# Patient Record
Sex: MALE | Race: WHITE | Employment: OTHER | URBAN - METROPOLITAN AREA
[De-identification: names, ages, dates, MRNs, and addresses within clinical notes are randomized per-mention and may not be internally consistent; named-entity substitution may affect disease eponyms.]

---

## 2017-03-08 LAB
ALBUMIN SERPL-MCNC: 4.2 G/DL
ALP BLD-CCNC: 55 U/L
ALT SERPL-CCNC: 21 U/L
ANION GAP SERPL CALCULATED.3IONS-SCNC: NORMAL MMOL/L
AST SERPL-CCNC: 21 U/L
BASOPHILS ABSOLUTE: 0 /ΜL
BASOPHILS RELATIVE PERCENT: 0.4 %
BILIRUB SERPL-MCNC: 0.5 MG/DL (ref 0.1–1.4)
BUN BLDV-MCNC: 20 MG/DL
CALCIUM SERPL-MCNC: 9.2 MG/DL
CHLORIDE BLD-SCNC: 102 MMOL/L
CHOLESTEROL, TOTAL: 184 MG/DL
CHOLESTEROL/HDL RATIO: NORMAL
CO2: 22 MMOL/L
CREAT SERPL-MCNC: 1.3 MG/DL
EOSINOPHILS ABSOLUTE: 0.2 /ΜL
EOSINOPHILS RELATIVE PERCENT: 3 %
GFR CALCULATED: NORMAL
GLUCOSE BLD-MCNC: 107 MG/DL
HCT VFR BLD CALC: 47.6 % (ref 41–53)
HDLC SERPL-MCNC: 51 MG/DL (ref 35–70)
HEMOGLOBIN: 15.8 G/DL (ref 13.5–17.5)
LDL CHOLESTEROL CALCULATED: 116 MG/DL (ref 0–160)
LYMPHOCYTES ABSOLUTE: 1.1 /ΜL
LYMPHOCYTES RELATIVE PERCENT: 13.6 %
MCH RBC QN AUTO: 31 PG
MCHC RBC AUTO-ENTMCNC: 33.1 G/DL
MCV RBC AUTO: 93.6 FL
MONOCYTES ABSOLUTE: 1.3 /ΜL
MONOCYTES RELATIVE PERCENT: 16.9 %
NEUTROPHILS ABSOLUTE: 5.2 /ΜL
NEUTROPHILS RELATIVE PERCENT: 66.1 %
PLATELET # BLD: 216 K/ΜL
PMV BLD AUTO: NORMAL FL
POTASSIUM SERPL-SCNC: 4.2 MMOL/L
RBC # BLD: 5.09 10^6/ΜL
SODIUM BLD-SCNC: 138 MMOL/L
TOTAL PROTEIN: 7
TRIGL SERPL-MCNC: 83 MG/DL
VLDLC SERPL CALC-MCNC: 17 MG/DL
WBC # BLD: 7.8 10^3/ML

## 2017-05-12 ENCOUNTER — OFFICE VISIT (OUTPATIENT)
Dept: CARDIOLOGY CLINIC | Age: 62
End: 2017-05-12

## 2017-05-12 VITALS
DIASTOLIC BLOOD PRESSURE: 74 MMHG | SYSTOLIC BLOOD PRESSURE: 122 MMHG | WEIGHT: 214.6 LBS | BODY MASS INDEX: 28.44 KG/M2 | HEIGHT: 73 IN

## 2017-05-12 DIAGNOSIS — I48.0 PAROXYSMAL ATRIAL FIBRILLATION (HCC): Primary | ICD-10-CM

## 2017-05-12 DIAGNOSIS — K21.9 GASTROESOPHAGEAL REFLUX DISEASE WITHOUT ESOPHAGITIS: ICD-10-CM

## 2017-05-12 DIAGNOSIS — I10 ESSENTIAL HYPERTENSION: ICD-10-CM

## 2017-05-12 DIAGNOSIS — E78.2 MIXED HYPERLIPIDEMIA: ICD-10-CM

## 2017-05-12 PROCEDURE — 93000 ELECTROCARDIOGRAM COMPLETE: CPT | Performed by: INTERNAL MEDICINE

## 2017-05-12 PROCEDURE — 99213 OFFICE O/P EST LOW 20 MIN: CPT | Performed by: INTERNAL MEDICINE

## 2017-05-12 PROCEDURE — G8419 CALC BMI OUT NRM PARAM NOF/U: HCPCS | Performed by: INTERNAL MEDICINE

## 2017-05-12 PROCEDURE — G8427 DOCREV CUR MEDS BY ELIG CLIN: HCPCS | Performed by: INTERNAL MEDICINE

## 2017-05-12 PROCEDURE — 1036F TOBACCO NON-USER: CPT | Performed by: INTERNAL MEDICINE

## 2017-05-12 PROCEDURE — 3017F COLORECTAL CA SCREEN DOC REV: CPT | Performed by: INTERNAL MEDICINE

## 2017-05-12 RX ORDER — FAMOTIDINE 20 MG/1
20 TABLET, FILM COATED ORAL 2 TIMES DAILY
COMMUNITY

## 2017-05-12 RX ORDER — MONTELUKAST SODIUM 10 MG/1
10 TABLET ORAL NIGHTLY
COMMUNITY

## 2017-05-15 ENCOUNTER — TELEPHONE (OUTPATIENT)
Dept: CARDIOLOGY CLINIC | Age: 62
End: 2017-05-15

## 2017-07-31 ENCOUNTER — TELEPHONE (OUTPATIENT)
Dept: CARDIOLOGY CLINIC | Age: 62
End: 2017-07-31

## 2018-04-04 RX ORDER — FLECAINIDE ACETATE 100 MG/1
100 TABLET ORAL 2 TIMES DAILY
Qty: 60 TABLET | Refills: 11 | Status: SHIPPED | OUTPATIENT
Start: 2018-04-04 | End: 2018-07-06 | Stop reason: SDUPTHER

## 2018-07-06 ENCOUNTER — OFFICE VISIT (OUTPATIENT)
Dept: CARDIOLOGY CLINIC | Age: 63
End: 2018-07-06

## 2018-07-06 VITALS
BODY MASS INDEX: 29.26 KG/M2 | HEART RATE: 67 BPM | DIASTOLIC BLOOD PRESSURE: 80 MMHG | HEIGHT: 73 IN | WEIGHT: 220.8 LBS | SYSTOLIC BLOOD PRESSURE: 110 MMHG

## 2018-07-06 DIAGNOSIS — I10 ESSENTIAL HYPERTENSION: ICD-10-CM

## 2018-07-06 DIAGNOSIS — K21.9 GASTROESOPHAGEAL REFLUX DISEASE WITHOUT ESOPHAGITIS: ICD-10-CM

## 2018-07-06 DIAGNOSIS — I48.0 PAROXYSMAL ATRIAL FIBRILLATION (HCC): Primary | ICD-10-CM

## 2018-07-06 DIAGNOSIS — E78.2 MIXED HYPERLIPIDEMIA: ICD-10-CM

## 2018-07-06 PROCEDURE — 99213 OFFICE O/P EST LOW 20 MIN: CPT | Performed by: INTERNAL MEDICINE

## 2018-07-06 PROCEDURE — G8419 CALC BMI OUT NRM PARAM NOF/U: HCPCS | Performed by: INTERNAL MEDICINE

## 2018-07-06 PROCEDURE — 1036F TOBACCO NON-USER: CPT | Performed by: INTERNAL MEDICINE

## 2018-07-06 PROCEDURE — 93000 ELECTROCARDIOGRAM COMPLETE: CPT | Performed by: INTERNAL MEDICINE

## 2018-07-06 PROCEDURE — G8427 DOCREV CUR MEDS BY ELIG CLIN: HCPCS | Performed by: INTERNAL MEDICINE

## 2018-07-06 PROCEDURE — 3017F COLORECTAL CA SCREEN DOC REV: CPT | Performed by: INTERNAL MEDICINE

## 2018-07-06 RX ORDER — FLECAINIDE ACETATE 100 MG/1
100 TABLET ORAL 2 TIMES DAILY
Qty: 180 TABLET | Refills: 3 | Status: SHIPPED | OUTPATIENT
Start: 2018-07-06 | End: 2019-08-01 | Stop reason: SDUPTHER

## 2018-07-06 NOTE — PATIENT INSTRUCTIONS
Please remember to bring all medication bottles or a medication list with you to your appointment. If you have any questions, please call our office at [phone]. CAD:No  HTN:well controlled on current medical regimen, see list above. - changes in  treatment:   no   CARDIOMYOPATHY: None known   CONGESTIVE HEART FAILURE: NO KNOWN HISTORY.     VHD: No significant VHD noted  DYSLIPIDEMIA: Patient's profile is at / near [employer],    OTHER RELEVANT DIAGNOSIS:as noted in patient's active problem list:  TESTS ORDERED: None this visit                                   All previously ordered tests reviewed. ARRHYTHMIAS: known                                Patient has H/O Atrial fibrillation                                Patient is in NSR with the help of anti arrhythmics. MEDICATIONS: CPM   Office f/u in one year. Primary/secondary prevention is the goal by aggressive risk modification, healthy and therapeutic life style changes for cardiovascular risk reduction. Various goals are discussed and multiple questions answered.

## 2018-07-06 NOTE — PROGRESS NOTES
and leg or swelling in foot and leg  Neurological: Negative for dizziness, headaches, memory loss, numbness/tingling, visual changes, syncope  Dermatological: Negative for rash    Objective:  /80   Pulse 67   Ht 6' 1\" (1.854 m)   Wt 220 lb 12.8 oz (100.2 kg)   BMI 29.13 kg/m²   Wt Readings from Last 3 Encounters:   07/06/18 220 lb 12.8 oz (100.2 kg)   05/12/17 214 lb 9.6 oz (97.3 kg)   12/13/16 221 lb 12.8 oz (100.6 kg)     Body mass index is 29.13 kg/m². GENERAL - Alert, oriented, pleasant, in no apparent distress. EYES: No jaundice, no conjunctival pallor. SKIN: It is warm & dry. No rashes. No Echhymosis    HEENT  No clinically significant abnormalities seen. Neck - Supple. No jugular venous distention noted. No carotid bruits. Cardiovascular  Normal S1 and S2 without obvious murmur or gallop. Extremities - No cyanosis, clubbing, or significant edema. Pulmonary  No respiratory distress. No wheezes or rales. Abdomen  No masses, tenderness, or organomegaly. Musculoskeletal  No significant edema. No joint deformities. No muscle wasting. Neurologic  Cranial nerves II through XII are grossly intact. There were no gross focal neurologic abnormalities.     Lab Review   No results found for: CKTOTAL, CKMB, CKMBINDEX, TROPONINT  BNP:  No results found for: BNP  PT/INR:  No results found for: INR  No results found for: LABA1C  Lab Results   Component Value Date    WBC 7.1 01/09/2012    HCT 41.6 (L) 01/09/2012    MCV 94.1 01/09/2012     01/09/2012     No results found for: CHOL, TRIG, HDL, LDLCALC, LDLDIRECT, CHOLHDLRATIO  No results found for: ALT, AST  BMP:    Lab Results   Component Value Date     01/09/2012    K 4.3 01/09/2012     01/09/2012    CO2 29 01/09/2012    BUN 21 01/09/2012    CREATININE 1.1 01/09/2012     CMP:   Lab Results   Component Value Date     01/09/2012    K 4.3 01/09/2012     01/09/2012    CO2 29 01/09/2012    BUN 21 01/09/2012     TSH: No results found for: TSH, TSHHS    QUALITY MEASURES REVIEWED:  1.CAD:Patient is taking anti platelet agent:Yes  2. DYSLIPIDEMIA: Patient is on cholesterol lowering medication:Yes  3. Beta-Blocker therapy for CAD, if prior Myocardial Infarction:Yes  4. Atrial fibrillation & anticoagulation therapy No  5. Discussed weight management strategies. EKG: NSR, QTc 424    Impression:    1. Paroxysmal atrial fibrillation (HCC)    2. Mixed hyperlipidemia    3. Gastroesophageal reflux disease without esophagitis    4. Essential hypertension       Patient Active Problem List   Diagnosis Code    Radial head fracture S52.123A    Atrial fibrillation (HCC) I48.91    Hyperlipidemia E78.5    GERD (gastroesophageal reflux disease) K21.9    Hypertension I10       Assessment & Plan:    CAD:No  HTN:well controlled on current medical regimen, see list above. - changes in  treatment:   no   CARDIOMYOPATHY: None known   CONGESTIVE HEART FAILURE: NO KNOWN HISTORY.     VHD: No significant VHD noted  DYSLIPIDEMIA: Patient's profile is at / near [employer],    OTHER RELEVANT DIAGNOSIS:as noted in patient's active problem list:  TESTS ORDERED: None this visit                                   All previously ordered tests reviewed. ARRHYTHMIAS: known                                Patient has H/O Atrial fibrillation                                Patient is in NSR with the help of anti arrhythmics. MEDICATIONS: CPM   Office f/u in one year. Primary/secondary prevention is the goal by aggressive risk modification, healthy and therapeutic life style changes for cardiovascular risk reduction. Various goals are discussed and multiple questions answered.

## 2019-01-18 ENCOUNTER — TELEPHONE (OUTPATIENT)
Dept: CARDIOLOGY CLINIC | Age: 64
End: 2019-01-18

## 2019-01-21 ENCOUNTER — OFFICE VISIT (OUTPATIENT)
Dept: CARDIOLOGY CLINIC | Age: 64
End: 2019-01-21
Payer: COMMERCIAL

## 2019-01-21 VITALS
HEIGHT: 73 IN | DIASTOLIC BLOOD PRESSURE: 84 MMHG | SYSTOLIC BLOOD PRESSURE: 122 MMHG | BODY MASS INDEX: 30.27 KG/M2 | HEART RATE: 60 BPM | WEIGHT: 228.4 LBS

## 2019-01-21 DIAGNOSIS — I10 ESSENTIAL HYPERTENSION: ICD-10-CM

## 2019-01-21 DIAGNOSIS — I48.0 PAROXYSMAL ATRIAL FIBRILLATION (HCC): Primary | ICD-10-CM

## 2019-01-21 DIAGNOSIS — K21.9 GASTROESOPHAGEAL REFLUX DISEASE WITHOUT ESOPHAGITIS: ICD-10-CM

## 2019-01-21 DIAGNOSIS — E78.2 MIXED HYPERLIPIDEMIA: ICD-10-CM

## 2019-01-21 PROCEDURE — G8427 DOCREV CUR MEDS BY ELIG CLIN: HCPCS | Performed by: INTERNAL MEDICINE

## 2019-01-21 PROCEDURE — 99213 OFFICE O/P EST LOW 20 MIN: CPT | Performed by: INTERNAL MEDICINE

## 2019-01-21 PROCEDURE — 3017F COLORECTAL CA SCREEN DOC REV: CPT | Performed by: INTERNAL MEDICINE

## 2019-01-21 PROCEDURE — G8417 CALC BMI ABV UP PARAM F/U: HCPCS | Performed by: INTERNAL MEDICINE

## 2019-01-21 PROCEDURE — G8484 FLU IMMUNIZE NO ADMIN: HCPCS | Performed by: INTERNAL MEDICINE

## 2019-01-21 PROCEDURE — 1036F TOBACCO NON-USER: CPT | Performed by: INTERNAL MEDICINE

## 2019-01-21 PROCEDURE — 93000 ELECTROCARDIOGRAM COMPLETE: CPT | Performed by: INTERNAL MEDICINE

## 2019-01-22 DIAGNOSIS — E78.2 MIXED HYPERLIPIDEMIA: ICD-10-CM

## 2019-01-22 DIAGNOSIS — I48.91 ATRIAL FIBRILLATION, UNSPECIFIED TYPE (HCC): ICD-10-CM

## 2019-01-22 DIAGNOSIS — R06.02 SOB (SHORTNESS OF BREATH): Primary | ICD-10-CM

## 2019-01-22 DIAGNOSIS — I10 ESSENTIAL HYPERTENSION: ICD-10-CM

## 2019-01-24 ENCOUNTER — TELEPHONE (OUTPATIENT)
Dept: CARDIOLOGY CLINIC | Age: 64
End: 2019-01-24

## 2019-01-30 ENCOUNTER — HOSPITAL ENCOUNTER (OUTPATIENT)
Dept: SLEEP CENTER | Age: 64
Discharge: HOME | End: 2019-01-30
Payer: COMMERCIAL

## 2019-01-30 VITALS
BODY MASS INDEX: 30.75 KG/M2 | HEIGHT: 73 IN | HEART RATE: 78 BPM | WEIGHT: 232 LBS | OXYGEN SATURATION: 96 % | SYSTOLIC BLOOD PRESSURE: 116 MMHG | DIASTOLIC BLOOD PRESSURE: 73 MMHG

## 2019-01-30 DIAGNOSIS — E66.9 OBESITY (BMI 30.0-34.9): ICD-10-CM

## 2019-01-30 DIAGNOSIS — G47.33 OSA (OBSTRUCTIVE SLEEP APNEA): ICD-10-CM

## 2019-01-30 DIAGNOSIS — G47.19 EXCESSIVE DAYTIME SLEEPINESS: ICD-10-CM

## 2019-01-30 PROBLEM — E66.811 OBESITY (BMI 30.0-34.9): Status: ACTIVE | Noted: 2019-01-30

## 2019-01-30 PROCEDURE — 99204 OFFICE O/P NEW MOD 45 MIN: CPT | Performed by: INTERNAL MEDICINE

## 2019-01-30 PROCEDURE — 99211 OFF/OP EST MAY X REQ PHY/QHP: CPT | Performed by: INTERNAL MEDICINE

## 2019-01-30 ASSESSMENT — SLEEP AND FATIGUE QUESTIONNAIRES
HOW LIKELY ARE YOU TO NOD OFF OR FALL ASLEEP WHILE SITTING QUIETLY AFTER LUNCH WITHOUT ALCOHOL: 3
HOW LIKELY ARE YOU TO NOD OFF OR FALL ASLEEP WHILE SITTING AND READING: 3
HOW LIKELY ARE YOU TO NOD OFF OR FALL ASLEEP WHEN YOU ARE A PASSENGER IN A CAR FOR AN HOUR WITHOUT A BREAK: 2
HOW LIKELY ARE YOU TO NOD OFF OR FALL ASLEEP WHILE SITTING AND TALKING TO SOMEONE: 1
HOW LIKELY ARE YOU TO NOD OFF OR FALL ASLEEP WHILE LYING DOWN TO REST IN THE AFTERNOON WHEN CIRCUMSTANCES PERMIT: 3
HOW LIKELY ARE YOU TO NOD OFF OR FALL ASLEEP WHILE SITTING INACTIVE IN A PUBLIC PLACE: 1
HOW LIKELY ARE YOU TO NOD OFF OR FALL ASLEEP IN A CAR, WHILE STOPPED FOR A FEW MINUTES IN TRAFFIC: 2
ESS TOTAL SCORE: 18
HOW LIKELY ARE YOU TO NOD OFF OR FALL ASLEEP WHILE WATCHING TV: 3

## 2019-02-05 ENCOUNTER — PROCEDURE VISIT (OUTPATIENT)
Dept: CARDIOLOGY CLINIC | Age: 64
End: 2019-02-05
Payer: COMMERCIAL

## 2019-02-05 VITALS
SYSTOLIC BLOOD PRESSURE: 128 MMHG | BODY MASS INDEX: 29.29 KG/M2 | WEIGHT: 221 LBS | HEART RATE: 75 BPM | DIASTOLIC BLOOD PRESSURE: 78 MMHG | HEIGHT: 73 IN

## 2019-02-05 DIAGNOSIS — I48.0 PAROXYSMAL ATRIAL FIBRILLATION (HCC): Primary | ICD-10-CM

## 2019-02-05 DIAGNOSIS — E78.2 MIXED HYPERLIPIDEMIA: ICD-10-CM

## 2019-02-05 DIAGNOSIS — I10 ESSENTIAL HYPERTENSION: ICD-10-CM

## 2019-02-05 DIAGNOSIS — K21.9 GASTROESOPHAGEAL REFLUX DISEASE WITHOUT ESOPHAGITIS: ICD-10-CM

## 2019-02-05 DIAGNOSIS — R06.02 SHORTNESS OF BREATH: Primary | ICD-10-CM

## 2019-02-05 DIAGNOSIS — I48.0 PAROXYSMAL ATRIAL FIBRILLATION (HCC): ICD-10-CM

## 2019-02-05 DIAGNOSIS — R00.2 PALPITATIONS: ICD-10-CM

## 2019-02-05 LAB
LV EF: 48 %
LVEF MODALITY: NORMAL

## 2019-02-05 PROCEDURE — 93306 TTE W/DOPPLER COMPLETE: CPT | Performed by: INTERNAL MEDICINE

## 2019-02-05 PROCEDURE — 93015 CV STRESS TEST SUPVJ I&R: CPT | Performed by: INTERNAL MEDICINE

## 2019-02-06 ENCOUNTER — TELEPHONE (OUTPATIENT)
Dept: CARDIOLOGY CLINIC | Age: 64
End: 2019-02-06

## 2019-02-18 ENCOUNTER — HOSPITAL ENCOUNTER (OUTPATIENT)
Dept: SLEEP CENTER | Age: 64
Discharge: HOME | End: 2019-02-18
Payer: COMMERCIAL

## 2019-02-18 PROCEDURE — G0398 HOME SLEEP TEST/TYPE 2 PORTA: HCPCS

## 2019-02-18 PROCEDURE — 95806 SLEEP STUDY UNATT&RESP EFFT: CPT | Performed by: INTERNAL MEDICINE

## 2019-02-21 ENCOUNTER — NURSE ONLY (OUTPATIENT)
Dept: CARDIOLOGY CLINIC | Age: 64
End: 2019-02-21
Payer: COMMERCIAL

## 2019-02-21 ENCOUNTER — OFFICE VISIT (OUTPATIENT)
Dept: CARDIOLOGY CLINIC | Age: 64
End: 2019-02-21
Payer: COMMERCIAL

## 2019-02-21 ENCOUNTER — TELEPHONE (OUTPATIENT)
Dept: CARDIOLOGY CLINIC | Age: 64
End: 2019-02-21

## 2019-02-21 VITALS
HEIGHT: 73 IN | DIASTOLIC BLOOD PRESSURE: 82 MMHG | SYSTOLIC BLOOD PRESSURE: 118 MMHG | BODY MASS INDEX: 30.54 KG/M2 | HEART RATE: 78 BPM | WEIGHT: 230.4 LBS | RESPIRATION RATE: 16 BRPM

## 2019-02-21 DIAGNOSIS — R06.02 SHORTNESS OF BREATH: ICD-10-CM

## 2019-02-21 DIAGNOSIS — I48.91 ATRIAL FIBRILLATION, UNSPECIFIED TYPE (HCC): ICD-10-CM

## 2019-02-21 DIAGNOSIS — R07.9 CHEST PAIN, UNSPECIFIED TYPE: Primary | ICD-10-CM

## 2019-02-21 DIAGNOSIS — I10 ESSENTIAL HYPERTENSION: ICD-10-CM

## 2019-02-21 DIAGNOSIS — I48.91 ATRIAL FIBRILLATION, UNSPECIFIED TYPE (HCC): Primary | ICD-10-CM

## 2019-02-21 PROCEDURE — 93000 ELECTROCARDIOGRAM COMPLETE: CPT | Performed by: NURSE PRACTITIONER

## 2019-02-21 PROCEDURE — 99214 OFFICE O/P EST MOD 30 MIN: CPT | Performed by: NURSE PRACTITIONER

## 2019-03-06 ENCOUNTER — HOSPITAL ENCOUNTER (OUTPATIENT)
Dept: SLEEP CENTER | Age: 64
Discharge: HOME | End: 2019-03-06
Payer: COMMERCIAL

## 2019-03-06 DIAGNOSIS — G47.33 OSA (OBSTRUCTIVE SLEEP APNEA): ICD-10-CM

## 2019-03-06 DIAGNOSIS — G47.19 EXCESSIVE DAYTIME SLEEPINESS: ICD-10-CM

## 2019-03-06 DIAGNOSIS — E66.9 OBESITY (BMI 30.0-34.9): ICD-10-CM

## 2019-03-06 PROCEDURE — 99214 OFFICE O/P EST MOD 30 MIN: CPT | Performed by: INTERNAL MEDICINE

## 2019-03-06 ASSESSMENT — ENCOUNTER SYMPTOMS
SHORTNESS OF BREATH: 0
COUGH: 1
ABDOMINAL DISTENTION: 0
EYE ITCHING: 0
BACK PAIN: 0
ABDOMINAL PAIN: 0
EYE DISCHARGE: 0

## 2019-03-22 PROCEDURE — 93228 REMOTE 30 DAY ECG REV/REPORT: CPT | Performed by: INTERNAL MEDICINE

## 2019-03-26 ENCOUNTER — TELEPHONE (OUTPATIENT)
Dept: CARDIOLOGY CLINIC | Age: 64
End: 2019-03-26

## 2019-03-26 RX ORDER — RIVAROXABAN 15 MG/1
1 TABLET, FILM COATED ORAL 2 TIMES DAILY
COMMUNITY
Start: 2019-03-09 | End: 2019-04-23 | Stop reason: ALTCHOICE

## 2019-04-23 RX ORDER — ALBUTEROL SULFATE 90 UG/1
2 AEROSOL, METERED RESPIRATORY (INHALATION) EVERY 6 HOURS PRN
COMMUNITY

## 2019-04-24 ENCOUNTER — HOSPITAL ENCOUNTER (INPATIENT)
Dept: INTERVENTIONAL RADIOLOGY/VASCULAR | Age: 64
LOS: 2 days | Discharge: HOME | DRG: 253 | End: 2019-04-26
Attending: GENERAL PRACTICE | Admitting: GENERAL PRACTICE
Payer: COMMERCIAL

## 2019-04-24 PROBLEM — I82.411 DVT FEMORAL (DEEP VENOUS THROMBOSIS) WITH THROMBOPHLEBITIS, RIGHT (HCC): Status: ACTIVE | Noted: 2019-04-24

## 2019-04-24 PROBLEM — I82.401 ACUTE DEEP VEIN THROMBOSIS (DVT) OF RIGHT LOWER EXTREMITY (HCC): Status: ACTIVE | Noted: 2019-04-24

## 2019-04-24 LAB
APTT: 37.8 SECONDS (ref 21.2–33)
BUN BLDV-MCNC: 16 MG/DL (ref 6–23)
CREAT SERPL-MCNC: 1.2 MG/DL (ref 0.9–1.3)
FIBRINOGEN LEVEL: 297 MG/DL (ref 196.9–442.1)
GFR AFRICAN AMERICAN: >60 ML/MIN/1.73M2
GFR NON-AFRICAN AMERICAN: >60 ML/MIN/1.73M2
HCT VFR BLD CALC: 45 % (ref 42–52)
HEMOGLOBIN: 14.4 GM/DL (ref 13.5–18)
INR BLD: 1.68 INDEX
MCH RBC QN AUTO: 31.1 PG (ref 27–31)
MCHC RBC AUTO-ENTMCNC: 32 % (ref 32–36)
MCV RBC AUTO: 97.2 FL (ref 78–100)
PDW BLD-RTO: 13.2 % (ref 11.7–14.9)
PLATELET # BLD: 189 K/CU MM (ref 140–440)
PMV BLD AUTO: 9.2 FL (ref 7.5–11.1)
PROTHROMBIN TIME: 19.4 SECONDS (ref 9.12–12.5)
RBC # BLD: 4.63 M/CU MM (ref 4.6–6.2)
WBC # BLD: 4.4 K/CU MM (ref 4–10.5)

## 2019-04-24 PROCEDURE — 76499 UNLISTED DX RADIOGRAPHIC PX: CPT

## 2019-04-24 PROCEDURE — 62323 NJX INTERLAMINAR LMBR/SAC: CPT

## 2019-04-24 PROCEDURE — C1887 CATHETER, GUIDING: HCPCS

## 2019-04-24 PROCEDURE — C1769 GUIDE WIRE: HCPCS

## 2019-04-24 PROCEDURE — 85610 PROTHROMBIN TIME: CPT

## 2019-04-24 PROCEDURE — 6360000002 HC RX W HCPCS: Performed by: RADIOLOGY

## 2019-04-24 PROCEDURE — 82565 ASSAY OF CREATININE: CPT

## 2019-04-24 PROCEDURE — C1894 INTRO/SHEATH, NON-LASER: HCPCS

## 2019-04-24 PROCEDURE — 2000000000 HC ICU R&B

## 2019-04-24 PROCEDURE — 3E03317 INTRODUCTION OF OTHER THROMBOLYTIC INTO PERIPHERAL VEIN, PERCUTANEOUS APPROACH: ICD-10-PCS | Performed by: RADIOLOGY

## 2019-04-24 PROCEDURE — 36005 INJECTION EXT VENOGRAPHY: CPT

## 2019-04-24 PROCEDURE — 6370000000 HC RX 637 (ALT 250 FOR IP): Performed by: GENERAL PRACTICE

## 2019-04-24 PROCEDURE — 85730 THROMBOPLASTIN TIME PARTIAL: CPT

## 2019-04-24 PROCEDURE — 75820 VEIN X-RAY ARM/LEG: CPT

## 2019-04-24 PROCEDURE — 37212 THROMBOLYTIC VENOUS THERAPY: CPT

## 2019-04-24 PROCEDURE — 84520 ASSAY OF UREA NITROGEN: CPT

## 2019-04-24 PROCEDURE — 2709999900 HC NON-CHARGEABLE SUPPLY

## 2019-04-24 PROCEDURE — 85384 FIBRINOGEN ACTIVITY: CPT

## 2019-04-24 PROCEDURE — 2580000003 HC RX 258: Performed by: RADIOLOGY

## 2019-04-24 PROCEDURE — B51B1ZZ FLUOROSCOPY OF RIGHT LOWER EXTREMITY VEINS USING LOW OSMOLAR CONTRAST: ICD-10-PCS | Performed by: RADIOLOGY

## 2019-04-24 PROCEDURE — 6A750Z6 ULTRASOUND THERAPY OF PERIPHERAL VESSELS, SINGLE: ICD-10-PCS | Performed by: RADIOLOGY

## 2019-04-24 PROCEDURE — 85027 COMPLETE CBC AUTOMATED: CPT

## 2019-04-24 RX ORDER — SILDENAFIL 100 MG/1
100 TABLET, FILM COATED ORAL PRN
Status: DISCONTINUED | OUTPATIENT
Start: 2019-04-24 | End: 2019-04-24

## 2019-04-24 RX ORDER — METOPROLOL SUCCINATE 25 MG/1
12.5 TABLET, EXTENDED RELEASE ORAL DAILY
Status: DISCONTINUED | OUTPATIENT
Start: 2019-04-24 | End: 2019-04-26 | Stop reason: HOSPADM

## 2019-04-24 RX ORDER — SODIUM CHLORIDE 0.9 % (FLUSH) 0.9 %
10 SYRINGE (ML) INJECTION PRN
Status: DISCONTINUED | OUTPATIENT
Start: 2019-04-24 | End: 2019-04-26 | Stop reason: HOSPADM

## 2019-04-24 RX ORDER — ASPIRIN 81 MG/1
81 TABLET ORAL DAILY
Status: DISCONTINUED | OUTPATIENT
Start: 2019-04-24 | End: 2019-04-26 | Stop reason: HOSPADM

## 2019-04-24 RX ORDER — ACETAMINOPHEN 80 MG
TABLET,CHEWABLE ORAL
Status: COMPLETED
Start: 2019-04-24 | End: 2019-04-24

## 2019-04-24 RX ORDER — FAMOTIDINE 20 MG/1
20 TABLET, FILM COATED ORAL 2 TIMES DAILY
Status: DISCONTINUED | OUTPATIENT
Start: 2019-04-24 | End: 2019-04-26 | Stop reason: HOSPADM

## 2019-04-24 RX ORDER — LEVOTHYROXINE SODIUM 0.05 MG/1
50 TABLET ORAL DAILY
Status: DISCONTINUED | OUTPATIENT
Start: 2019-04-24 | End: 2019-04-26 | Stop reason: HOSPADM

## 2019-04-24 RX ORDER — MONTELUKAST SODIUM 10 MG/1
10 TABLET ORAL NIGHTLY
Status: DISCONTINUED | OUTPATIENT
Start: 2019-04-24 | End: 2019-04-26 | Stop reason: HOSPADM

## 2019-04-24 RX ORDER — ATORVASTATIN CALCIUM 10 MG/1
10 TABLET, FILM COATED ORAL DAILY
Status: DISCONTINUED | OUTPATIENT
Start: 2019-04-24 | End: 2019-04-26 | Stop reason: HOSPADM

## 2019-04-24 RX ORDER — FLECAINIDE ACETATE 50 MG/1
100 TABLET ORAL 2 TIMES DAILY
Status: DISCONTINUED | OUTPATIENT
Start: 2019-04-24 | End: 2019-04-26 | Stop reason: HOSPADM

## 2019-04-24 RX ORDER — DIPHENHYDRAMINE HCL 25 MG
25 TABLET ORAL 2 TIMES DAILY
Status: DISCONTINUED | OUTPATIENT
Start: 2019-04-24 | End: 2019-04-26 | Stop reason: HOSPADM

## 2019-04-24 RX ORDER — ALBUTEROL SULFATE 90 UG/1
2 AEROSOL, METERED RESPIRATORY (INHALATION) EVERY 6 HOURS PRN
Status: DISCONTINUED | OUTPATIENT
Start: 2019-04-24 | End: 2019-04-26 | Stop reason: HOSPADM

## 2019-04-24 RX ORDER — CETIRIZINE HYDROCHLORIDE 10 MG/1
10 TABLET ORAL DAILY
Status: DISCONTINUED | OUTPATIENT
Start: 2019-04-24 | End: 2019-04-26 | Stop reason: HOSPADM

## 2019-04-24 RX ORDER — FEXOFENADINE HYDROCHLORIDE 60 MG/1
60 TABLET, FILM COATED ORAL 2 TIMES DAILY
Status: DISCONTINUED | OUTPATIENT
Start: 2019-04-24 | End: 2019-04-24 | Stop reason: CLARIF

## 2019-04-24 RX ADMIN — DIPHENHYDRAMINE HCL 25 MG: 25 TABLET ORAL at 23:45

## 2019-04-24 RX ADMIN — ALTEPLASE: 2.2 INJECTION, POWDER, LYOPHILIZED, FOR SOLUTION INTRAVENOUS at 15:18

## 2019-04-24 RX ADMIN — Medication: at 15:28

## 2019-04-24 RX ADMIN — ATORVASTATIN CALCIUM 10 MG: 10 TABLET, FILM COATED ORAL at 15:25

## 2019-04-24 RX ADMIN — MONTELUKAST SODIUM 10 MG: 10 TABLET, COATED ORAL at 19:59

## 2019-04-24 RX ADMIN — FAMOTIDINE 20 MG: 20 TABLET, FILM COATED ORAL at 19:58

## 2019-04-24 RX ADMIN — LEVOTHYROXINE SODIUM 50 MCG: 50 TABLET ORAL at 15:25

## 2019-04-24 RX ADMIN — ALTEPLASE: 2.2 INJECTION, POWDER, LYOPHILIZED, FOR SOLUTION INTRAVENOUS at 21:45

## 2019-04-24 RX ADMIN — METOPROLOL SUCCINATE 12.5 MG: 25 TABLET, EXTENDED RELEASE ORAL at 15:25

## 2019-04-24 RX ADMIN — FLECAINIDE ACETATE 100 MG: 50 TABLET ORAL at 19:59

## 2019-04-24 ASSESSMENT — PAIN SCALES - GENERAL
PAINLEVEL_OUTOF10: 0
PAINLEVEL_OUTOF10: 0

## 2019-04-24 ASSESSMENT — PAIN - FUNCTIONAL ASSESSMENT: PAIN_FUNCTIONAL_ASSESSMENT: 0-10

## 2019-04-24 NOTE — H&P
Date:4/24/2019  Name:Richi Davis   PVB:[id number]   YD#:[id number]    SEX:male   Referring Physician:  Yobani Puckett  Primary Physician:  Gerry Redmond  Chief Complaint:  Leg swelling/DVT  History of Present Illness:   PE and DVT    HISTORY AND PHYSICAL  Deep vein thrombosis (DVT) of proximal vein of right lower extremity, unspecified chronicity ([facility] [address] [I82.4Y1]    Past Medical History:  [unfilled]    Past Surgical History:  Past Surgical History:   Procedure Laterality Date    CARDIAC CATHETERIZATION      20 years ago\"age 47 had cath and dx with atrial fib\"    COLONOSCOPY      age 50\"also age 56   Lina Roland ELBOW SURGERY  01/10/2012    Right elbow exploration , removal of loose body( per old chart hx ORIF right ulnar/radial shaft fx 2012) and also hx ulnar tunnel release right 2016    JOINT REPLACEMENT      per old chart total right hip 5/2017 and total left hip 8/2017    RHINOPLASTY      pe rold chart hx nasal fx ( in college)    TONSILLECTOMY  per old chart 5       Social History:  Social History     Socioeconomic History    Marital status:      Spouse name: Not on file    Number of children: Not on file    Years of education: Not on file    Highest education level: Not on file   Occupational History    Not on file   Social Needs    Financial resource strain: Not on file    Food insecurity:     Worry: Not on file     Inability: Not on file    Transportation needs:     Medical: Not on file     Non-medical: Not on file   Tobacco Use    Smoking status: Never Smoker    Smokeless tobacco: Never Used   Substance and Sexual Activity    Alcohol use: No     Alcohol/week: 0.0 oz    Drug use: No    Sexual activity: Never   Lifestyle    Physical activity:     Days per week: Not on file     Minutes per session: Not on file    Stress: Not on file   Relationships    Social connections:     Talks on phone: Not on file     Gets together: Not on file     Attends Congregational service: Not on file     Active member of club or organization: Not on file     Attends meetings of clubs or organizations: Not on file     Relationship status: Not on file    Intimate partner violence:     Fear of current or ex partner: Not on file     Emotionally abused: Not on file     Physically abused: Not on file     Forced sexual activity: Not on file   Other Topics Concern    Not on file   Social History Narrative    Not on file       Family History:  Family History   Problem Relation Age of Onset    High Blood Pressure Mother     Diabetes Father     High Blood Pressure Father        Allergies: Allergies   Allergen Reactions    Shellfish-Derived Products Anaphylaxis and Swelling    Other      \"the sticky stuff from EKG monitor pads burned my skin\"    Oxycodone Nausea Only    Tramadol Nausea Only     \       Medications:  Current Outpatient Medications on File Prior to Encounter   Medication Sig Dispense Refill    albuterol sulfate  (90 Base) MCG/ACT inhaler Inhale 2 puffs into the lungs every 6 hours as needed for Wheezing      rivaroxaban (XARELTO) 20 MG TABS tablet Take 20 mg by mouth daily Start after finishing Xarelto 15 mg BID x 3 weeks.       diphenhydrAMINE HCl, Sleep, (UNISOM SLEEPGELS) 50 MG CAPS Take 25 mg by mouth       flecainide (TAMBOCOR) 100 MG tablet Take 1 tablet by mouth 2 times daily 180 tablet 3    famotidine (PEPCID) 20 MG tablet Take 20 mg by mouth 2 times daily      montelukast (SINGULAIR) 10 MG tablet Take 10 mg by mouth nightly      atorvastatin (LIPITOR) 10 MG tablet Take 10 mg by mouth daily      levothyroxine (SYNTHROID) 50 MCG tablet Take 50 mcg by mouth Daily      metoprolol (TOPROL-XL) 25 MG XL tablet Take 12.5 mg by mouth daily       Fexofenadine HCl (ALLEGRA ALLERGY PO) Take by mouth daily       sildenafil (VIAGRA) 100 MG tablet Take 100 mg by mouth as needed for Erectile Dysfunction      aspirin 81 MG tablet Take 81 mg by mouth daily        No current facility-administered medications on file prior to encounter. Vital Signs:  @FLOWDT(6:last)@ @FLOWSTATM(6:24)@ @FLOWDT(5:last)@ @FLOWDT(8:last)@ @FLOWDT(9:last)@ @FLOWDT(10:last)@   @FLOWDT(14:first)@  @FLOWDT(14:last)@  Body mass index is 30.08 kg/m². Laboratory:  Recent Labs     04/24/19  1115   WBC 4.4   BUN 16   CREATININE 1.2   INR 1.68     INR @LABR24(INR)@    Physical Exam:  GENERAL:Well developed, well nourished in NAD  NECK: Neck exam - No JVD,HJR or carotid bruit, no thyromegaly   RESPIRATORY:Clear to auscultation  HEART:RRR,no murmer, gallop or friction rub          Impression:  Active Problems:    * No active hospital problems. *  Resolved Problems:    * No resolved hospital problems.  *        Mallampati Score 2  ASA class 2    PLAN OF CARE/PLANNED PROCEDURE    IR VENOGRAM LOWER EXTREMITY RIGHT [[id number]]

## 2019-04-25 ENCOUNTER — APPOINTMENT (OUTPATIENT)
Dept: INTERVENTIONAL RADIOLOGY/VASCULAR | Age: 64
DRG: 253 | End: 2019-04-25
Attending: GENERAL PRACTICE
Payer: COMMERCIAL

## 2019-04-25 LAB
FIBRINOGEN LEVEL: 159 MG/DL (ref 196.9–442.1)
FIBRINOGEN LEVEL: 162 MG/DL (ref 196.9–442.1)
FIBRINOGEN LEVEL: 204 MG/DL (ref 196.9–442.1)
FIBRINOGEN LEVEL: 244 MG/DL (ref 196.9–442.1)

## 2019-04-25 PROCEDURE — 37248 TRLUML BALO ANGIOP 1ST VEIN: CPT

## 2019-04-25 PROCEDURE — C1757 CATH, THROMBECTOMY/EMBOLECT: HCPCS

## 2019-04-25 PROCEDURE — 36005 INJECTION EXT VENOGRAPHY: CPT

## 2019-04-25 PROCEDURE — 85384 FIBRINOGEN ACTIVITY: CPT

## 2019-04-25 PROCEDURE — 6360000002 HC RX W HCPCS: Performed by: RADIOLOGY

## 2019-04-25 PROCEDURE — C1725 CATH, TRANSLUMIN NON-LASER: HCPCS

## 2019-04-25 PROCEDURE — 6370000000 HC RX 637 (ALT 250 FOR IP): Performed by: GENERAL PRACTICE

## 2019-04-25 PROCEDURE — 2000000000 HC ICU R&B

## 2019-04-25 PROCEDURE — 2709999900 HC NON-CHARGEABLE SUPPLY

## 2019-04-25 PROCEDURE — C1769 GUIDE WIRE: HCPCS

## 2019-04-25 PROCEDURE — 76499 UNLISTED DX RADIOGRAPHIC PX: CPT

## 2019-04-25 PROCEDURE — 067M3ZZ DILATION OF RIGHT FEMORAL VEIN, PERCUTANEOUS APPROACH: ICD-10-PCS | Performed by: GENERAL PRACTICE

## 2019-04-25 PROCEDURE — C1894 INTRO/SHEATH, NON-LASER: HCPCS

## 2019-04-25 PROCEDURE — 2580000003 HC RX 258: Performed by: RADIOLOGY

## 2019-04-25 PROCEDURE — 75820 VEIN X-RAY ARM/LEG: CPT

## 2019-04-25 RX ADMIN — ALTEPLASE: 2.2 INJECTION, POWDER, LYOPHILIZED, FOR SOLUTION INTRAVENOUS at 09:44

## 2019-04-25 RX ADMIN — MONTELUKAST SODIUM 10 MG: 10 TABLET, COATED ORAL at 19:38

## 2019-04-25 RX ADMIN — FLECAINIDE ACETATE 100 MG: 50 TABLET ORAL at 10:13

## 2019-04-25 RX ADMIN — FLECAINIDE ACETATE 100 MG: 50 TABLET ORAL at 19:38

## 2019-04-25 RX ADMIN — FAMOTIDINE 20 MG: 20 TABLET, FILM COATED ORAL at 19:38

## 2019-04-25 RX ADMIN — DIPHENHYDRAMINE HCL 25 MG: 25 TABLET ORAL at 10:11

## 2019-04-25 ASSESSMENT — PAIN SCALES - GENERAL
PAINLEVEL_OUTOF10: 0
PAINLEVEL_OUTOF10: 0

## 2019-04-25 NOTE — CARE COORDINATION
Cm met with pt to initiate discharge planning. Pt admittde withi acute embolism/thrombosis. Pt currently on EKOS. Pt and spouse reside together. Pt is active and independent. Pt is retired. Pt's wife works outside the home. Pt has had deandre hip replacements in the past and has the equip available that he used when he had his hips replaced. Pt has PCP and insurance to assist with cost of Rxs. Pt express no needs or concerns for discharge at this time. CM available to assist as needed.

## 2019-04-25 NOTE — H&P
[address] [id number] [facility], [address] [facility]                              HISTORY AND PHYSICAL    PATIENT NAME: Eva Abel                    :        1955  MED REC NO:   [id number]                          ROOM:       2107  ACCOUNT NO:   [de-identified]                           ADMIT DATE: 2019  PROVIDER:     Teresa Low    REASON FOR ADMISSION:  Right lower extremity swelling and DVT. HISTORY OF PRESENT ILLNESS:  The patient is a 24-year-old gentleman who  has a history of recent admission to the hospital for pulmonary embolism  as well as deep vein thrombosis in the right lower extremity. The  patient also has a history of atrial fibrillation, currently in normal  sinus with medications. He also has hypertension, gastroesophageal  reflux disease, and hyperlipidemia. The patient was seen in the office  and then was referred to interventional radiologist for possible  surgical intervention for deep vein thrombosis as the patient was having  severe pain and post-thrombotic syndrome. At this time, the patient was  admitted this morning, had a procedure done and currently is getting  acute pain in the right lower extremity. The patient is admitted for  further evaluation and treatment. At this time, the patient does not  have any history of shortness of breath. No chest pain. Denies any  history of nausea or vomiting. No headache or dizziness. The patient  denies any urinary complaints. PAST MEDICAL HISTORY:  Significant for recent pulmonary embolism and  deep vein thrombosis, also has a history of sleep apnea for which the  patient is seeing pulmonologist.  Also has hypertension, obesity, atrial  fibrillation, and gastroesophageal reflux disease. PAST SURGICAL HISTORY:  Significant for tonsillectomy, rhinoplasty,  joint replacement, elbow surgery, colonoscopy, and cardiac cath.     FAMILY HISTORY:  Significant for high blood pressure and diabetes. SOCIAL HISTORY:  The patient does not have any history of smoking,  alcohol, or drug use. The patient is retired at this time. Does not  have any history of drug abuse. REVIEW OF SYSTEMS:  CONSTITUTIONAL:  Negative for fever or chills. HEENT:  Negative. RESPIRATORY:  Negative other than has a history of pulmonary embolism  for which the patient is on medications. The patient denies any  shortness of breath. CARDIOVASCULAR:  No chest pain or palpitation. GI:  Negative. :  Negative. ENDOCRINE:  No history of diabetes. No polyuria or polydipsia. PSYCHIATRIC:  Negative. ALLERGY AND IMMUNOLOGY:  Negative. HOME MEDICATIONS:  Include albuterol HFA two puffs every 6 hours as  needed. Also Xarelto is 20 mg daily, Tambocor 100 mg two times a day,  Pepcid 20 mg two times a day, Singulair is 10 mg daily, Lipitor is 10 mg  daily, Synthroid 50 mcg daily, Toprol XL 12.5 mg daily, Allegra 60 mg  twice a day, Viagra 100 mg daily p.r.n., and aspirin 81 mg daily. ALLERGIES:  The patient is allergic to SHELLFISH PRODUCTS. Also some  redness of the skin secondary to ADHESIVE TAPE. PHYSICAL EXAMINATION:  GENERAL:  Shows that he is not in distress at this time. VITAL SIGNS:  Shows temperature 98 degrees Fahrenheit, pulse is 76,  respiratory rate is 19, BP is 98/81, O2 is 99%. HEENT:  The patient has normocephalic head. No sinus tenderness. NECK:  Without any stiffness or thyromegaly. RESPIRATORY:  The patient has good air entry. No wheezing or rhonchi. CARDIOVASCULAR:  S1 and S2 present. Rate and rhythm are regular. ABDOMEN:  Soft, nontender. Bowel sounds are present. EXTREMITIES:  Without any clubbing or cyanosis. The patient has trace  edema in the right lower extremity and the left is negative. CNS:  Nonfocal.    INVESTIGATIONS:  The patient's BUN is 16, creatinine is 1.2.  PT, INR,  PTT are 19.4, 1.68, and 37.8.   WBC is 4.4, hemoglobin is 14.4,  hematocrit is 45.0, platelet count is 278. ASSESSMENT:  1. Deep vein thrombosis of the right lower extremity subacute in  nature. The patient is currently getting TPA at this time. The  patient's condition will be monitored. 2.  Hypertension. 3.  Atrial fibrillation. 4.  Allergic rhinitis. 5.  Hypothyroidism. 6.  Hyperlipidemia. PLAN:  At this time, home medications are reviewed and restarted. The  patient is currently on TPA which will be continued. The patient's  condition will be monitored closely. The patient will be monitored for  side effect of TPA at this time. The patient will be reevaluated by Dr. Kira Uribe tomorrow morning. May require another intervention.         LETICIA GRIDER    D: 04/24/2019 15:59:30       T: 04/24/2019 20:19:10     [email or web address]  Job#: [id number]     Doc#: [id number]    CC:  <>

## 2019-04-25 NOTE — PROGRESS NOTES
INTERNAL MEDICINE PROGRESS NOTE        Michelle Rota   1955   Primary Care Physician:  James Watkins MD  Admit Date: 4/24/2019     Subjective:   Patient said that he is doing ok at this time. He did not have any chest pain, shortness of breath, no headache or dizziness. No abdominal pain. Objective:   /72   Pulse 62   Temp 98 °F (36.7 °C) (Oral)   Resp 15   Ht 6' 1\" (1.854 m)   Wt 228 lb (103.4 kg)   SpO2 98%   BMI 30.08 kg/m²    General appearance: alert, appears stated age and cooperative  Head: Normocephalic, without obvious abnormality, atraumatic  Neck: no adenopathy and supple, symmetrical, trachea midline  Lungs: clear to auscultation bilaterally  Heart: regular rate and rhythm and S1, S2 normal  Abdomen: soft, non-tender; bowel sounds normal; no masses,  no organomegaly  Extremities: no clubbing, cyanosis or edema  Neurologic: Grossly normal    Data Review  Lab Results   Component Value Date     03/08/2017    K 4.2 03/08/2017     03/08/2017    CO2 22 03/08/2017    CREATININE 1.2 04/24/2019    BUN 16 04/24/2019    CALCIUM 9.2 03/08/2017     Lab Results   Component Value Date    WBC 4.4 04/24/2019    HGB 14.4 04/24/2019    HCT 45.0 04/24/2019    MCV 97.2 04/24/2019     04/24/2019     INR/Prothrombin Time      Meds:    aspirin  81 mg Oral Daily    atorvastatin  10 mg Oral Daily    diphenhydrAMINE  25 mg Oral BID    famotidine  20 mg Oral BID    flecainide  100 mg Oral BID    levothyroxine  50 mcg Oral Daily    metoprolol succinate  12.5 mg Oral Daily    montelukast  10 mg Oral Nightly    cetirizine  10 mg Oral Daily     PRN Meds: sodium chloride flush, albuterol sulfate HFA    Assessment/Plan:   Patient Active Hospital Problem List:  Patient Active Problem List   Diagnosis    Radial head fracture    Atrial fibrillation ([address]    Hyperlipidemia    GERD (gastroesophageal reflux disease)    Hypertension    Obesity (BMI 30.0-34. 9)    SHAE (obstructive sleep apnea)    Excessive daytime sleepiness    Shortness of breath    DVT femoral (deep venous thrombosis) with thrombophlebitis, right (HCC)    Acute deep vein thrombosis (DVT) of right lower extremity (HCC)   Deep Vein thrombosis: continue present treatment  A. Fib   GERD  Hypertension        Plan:  -- we will continue present management   -- re evaluation of the Deep Vein thrombosis of the right lower ext.

## 2019-04-26 VITALS
BODY MASS INDEX: 30.22 KG/M2 | HEART RATE: 95 BPM | DIASTOLIC BLOOD PRESSURE: 95 MMHG | SYSTOLIC BLOOD PRESSURE: 118 MMHG | OXYGEN SATURATION: 98 % | RESPIRATION RATE: 20 BRPM | TEMPERATURE: 98.4 F | HEIGHT: 73 IN | WEIGHT: 228 LBS

## 2019-04-26 LAB
FIBRINOGEN LEVEL: 180 MG/DL (ref 196.9–442.1)
FIBRINOGEN LEVEL: 213 MG/DL (ref 196.9–442.1)

## 2019-04-26 PROCEDURE — 6370000000 HC RX 637 (ALT 250 FOR IP): Performed by: GENERAL PRACTICE

## 2019-04-26 PROCEDURE — 85384 FIBRINOGEN ACTIVITY: CPT

## 2019-04-26 PROCEDURE — C1751 CATH, INF, PER/CENT/MIDLINE: HCPCS

## 2019-04-26 RX ADMIN — CETIRIZINE HYDROCHLORIDE 10 MG: 10 TABLET, FILM COATED ORAL at 07:57

## 2019-04-26 RX ADMIN — ATORVASTATIN CALCIUM 10 MG: 10 TABLET, FILM COATED ORAL at 07:57

## 2019-04-26 RX ADMIN — DIPHENHYDRAMINE HCL 25 MG: 25 TABLET ORAL at 00:02

## 2019-04-26 RX ADMIN — FAMOTIDINE 20 MG: 20 TABLET, FILM COATED ORAL at 07:57

## 2019-04-26 RX ADMIN — DIPHENHYDRAMINE HCL 25 MG: 25 TABLET ORAL at 07:57

## 2019-04-26 RX ADMIN — FLECAINIDE ACETATE 100 MG: 50 TABLET ORAL at 07:58

## 2019-04-26 RX ADMIN — ASPIRIN 81 MG: 81 TABLET, COATED ORAL at 07:57

## 2019-04-26 RX ADMIN — METOPROLOL SUCCINATE 12.5 MG: 25 TABLET, EXTENDED RELEASE ORAL at 07:58

## 2019-04-26 RX ADMIN — LEVOTHYROXINE SODIUM 50 MCG: 50 TABLET ORAL at 06:06

## 2019-04-26 ASSESSMENT — PAIN SCALES - GENERAL: PAINLEVEL_OUTOF10: 0

## 2019-04-26 NOTE — PROGRESS NOTES
INTERNAL MEDICINE PROGRESS NOTE        Sonia Morris   1955   Primary Care Physician:  Jenny Duckworth MD  Admit Date: 4/24/2019     Subjective:   Pt is doing better today. Denies chest pain, SOB, nausea, vomiting, abdominal pain. Remainder of ROS is unremarkable. Meds, labs and other notes reviewed. Impression:     1. Restoration of flow within the superior portion of the right popliteal vein  and inferior portion of the right femoral vein with a moderate amount of  adherent thrombus present as well as a severe focal stenosis in the mid right  femoral vein after overnight ultrasound accelerated EKOS thrombolytic  infusion. 2.  Status post 10 mm by 4 cm percutaneous transluminal angioplasty in  successive over left stations across this section of the femoral-popliteal  venous system with resulting occlusive thrombus at the percutaneous  transluminal angioplasty sites. Patency was restored using the 8 [employer] Angiojet thrombectomy catheter. A    3.  Versus focal moderate residual stenosis in the mid femoral vein treated  using a 10 mm diameter by 6 cm percutaneous transluminal angioplasty at this  site with a good angiographic result.  There is marked improvement in flow  with this point with minimal collateral filling         Objective:   BP (!) 112/99   Pulse 102   Temp 98.3 °F (36.8 °C) (Oral)   Resp 23   Ht 6' 1\" (1.854 m)   Wt 228 lb (103.4 kg)   SpO2 98%   BMI 30.08 kg/m²  No results for input(s): POCGLU in the last 72 hours. I/O last 3 completed shifts: In: 240 [P.O.:240]  Out: 1950 [Urine:1950]  No intake/output data recorded.     Neck: no adenopathy and supple, symmetrical, trachea midline  Lungs: clear to auscultation bilaterally  Heart: regular rate and rhythm and S1, S2 normal  Abdomen: soft, non-tender; bowel sounds normal; no masses,  no organomegaly  Extremities: extremities normal, atraumatic, no cyanosis or edema  Neurologic: Grossly normal    Data Review  CBC with Differential:    Recent Labs     04/24/19  1115   WBC 4.4   RBC 4.63   HGB 14.4   HCT 45.0      MCV 97.2   MCH 31.1*   MCHC 32.0   RDW 13.2     CMP:    Recent Labs     04/24/19  1115   BUN 16   CREATININE 1.2   GFRAA >60   LABGLOM >60     PT/INR:    Recent Labs     04/24/19  1115   PROTIME 19.4*   INR 1.68     Meds:    aspirin  81 mg Oral Daily    atorvastatin  10 mg Oral Daily    diphenhydrAMINE  25 mg Oral BID    famotidine  20 mg Oral BID    flecainide  100 mg Oral BID    levothyroxine  50 mcg Oral Daily    metoprolol succinate  12.5 mg Oral Daily    montelukast  10 mg Oral Nightly    cetirizine  10 mg Oral Daily     PRN Meds: sodium chloride flush, albuterol sulfate HFA    Assessment/Plan:   1. Deep Vein thrombosis, Rt Lower ext: continue present treatment  2. A. Fib. Stable. 3. GERD  4. Hypertension. BP stable.             Yuriy Calvillo MD  4/26/2019 7:21 AM

## 2019-04-26 NOTE — DISCHARGE SUMMARY
Arturorh[facility] Sultana  Discharge Summary     Patient ID  Nerissa Garcia   1955  [id number]          Admit date: 4/24/2019   Discharge date: 4/26/2019      Admitting Physician: Dann Taylor MD   Discharge Physician: Melvin Sanderson MD    Discharge Diagnoses:    1. DVT Right lower ext. Treated with TPA by IR. 2.  Recent Rt lower ext DVT and PE on 03/06/2019. Treated with IV Heparin and Xarelto. 3.  Factor V Leyden deficiency. 4.  A fib, HTN. Discharged Condition: good    Hospital Course: The patient is a 82-year-old gentleman who  has a history of recent admission to the hospital for pulmonary embolism  as well as deep vein thrombosis in the right lower extremity. The  patient also has a history of atrial fibrillation, currently in normal  sinus with medications. He also has hypertension, gastroesophageal  reflux disease, and hyperlipidemia. The patient was seen in the office  and then was referred to interventional radiologist for possible  surgical intervention for deep vein thrombosis as the patient was having  severe pain and post-thrombotic syndrome. At this time, the patient was  admitted this morning, had a procedure done and currently is getting  acute pain in the right lower extremity. The patient is admitted for  further evaluation and treatment. At this time, the patient does not  have any history of shortness of breath. No chest pain. Denies any  history of nausea or vomiting. No headache or dizziness. The patient  denies any urinary complaints. Pt was seen by Dr Nguyen Rivas. Pt underwent successful TPA treatment. Pt has noted significant improvement in his swelling and pain in right lower ext. Pt DC home on Xarelto. Consults:     Interventional Radiologoist.     Significant Diagnostic Studies: The patient's BUN is 16, creatinine is 1.2.  PT, INR,  PTT are 19.4, 1.68, and 37.8. WBC is 4.4, hemoglobin is 14.4,  hematocrit is 45.0, platelet count is 095.       Ir Angio Through Catheter Follow Up Transcatheter Study    Result Date: 4/25/2019  PROCEDURE: IR TRANSCATHETER INTRAVASCULAR UPPER PERIPH STENT INTRO, 10 MM VENOUS PERCUTANEOUS TRANSLUMINAL ANGIOPLASTY OF THE FEMOROPOPLITEAL VEINS, ANGIOJET MECHANICAL THROMBECTOMY MODERATE CONSCIOUS SEDATION 4/25/2019 HISTORY: ORDERING SYSTEM PROVIDED HISTORY: History of deep venous thrombosis (DVT) of distal vein of right lower extremity TECHNOLOGIST PROVIDED HISTORY: Reason for exam:->tpa F/U CONTRAST: 65 cc of Optiray 300 SEDATION: 3.0 mg of Versed and 100 mcg fentanyl intravenously. Sedation time: 52 minutes FLUOROSCOPY DOSE AND TYPE OR TIME AND EXPOSURES: Fluoroscopic time: 6.4 minutes. AK: 172 mGy DESCRIPTION OF PROCEDURE: Informed consent was obtained after a detailed explanation of the procedure including risks, benefits, and alternatives. Universal protocol was observed. The patient was placed in a prone position the patient sheath as well as the EKOS catheter infusion system was prepped draped in usual sterile fashion. The EKOS catheter was removed over a stiff angled tip Glidewire. Hand injection through the sheath was then performed which revealed restoration of flow within the right popliteal vein and inferior several cm of the right femoral vein which was previously occluded. There is still some significant narrowing of the vein present due to adherent thrombus. A 10 mm diameter by 4 cm  balloon with 6 atmospheres of pressure and 15 seconds inflation time each station was utilized to perform a percutaneous transluminal angioplasty in the multiple overlapping stations from the level of the patella in the popliteal vein through the lower half of the right femoral vein. This resulted in occlusive thrombosis of these veins. The sheath was then upsized to 8 Western Shakira.   The 8 Western Shakira Zelante Angiojet thrombectomy catheter was then utilized to perform a rheolytic thrombectomy of this section of the right lower extremity venous system with restoration of flow. There was a small amount of adherent thrombus present in this section however this was not felt to be significant. There was a persistent stenosis present in the mid right femoral vein. This was treated utilizing a 10 mm diameter by 60 mm [place] balloon using 12 atmospheres of pressure for 30 seconds. The procedure was then terminated. The patient tolerated the entire procedure well without immediate complications. The sheath was then removed and hemostasis was achieved with manual compression. The patient received 4000 units of heparin intravenously during the procedure. Patient will be observed overnight with a sequential compression device (knee high) applied to his right lower extremity. FINDINGS: Restoration of flow within the superior portion of the right popliteal vein and inferior portion of the right femoral vein with a moderate amount of adherent thrombus present as well as a severe focal stenosis in the mid right femoral vein after overnight ultrasound accelerated EKOS thrombolytic infusion. Status post 10 mm by 4 cm percutaneous transluminal angioplasty in successive over left stations across this section of the femoral-popliteal venous system with resulting occlusive thrombus at the percutaneous transluminal angioplasty sites. Patency was restored using the 8 Western Shakira Zelante Angiojet thrombectomy catheter. A  focal moderate residual stenosis was persistent. This was treated using a 10 mm diameter by 6 cm percutaneous transluminal angioplasty at this site with a good angiographic result. There is marked improvement in flow with this point with minimal collateral filling.      1.Restoration of flow within the superior portion of the right popliteal vein and inferior portion of the right femoral vein with a moderate amount of adherent thrombus present as well as a severe focal stenosis in the mid right femoral vein after overnight ultrasound accelerated EKOS thrombolytic infusion. 2.  Status post 10 mm by 4 cm percutaneous transluminal angioplasty in successive over left stations across this section of the femoral-popliteal venous system with resulting occlusive thrombus at the percutaneous transluminal angioplasty sites. Patency was restored using the 8 Western Shakira Zelante Angiojet thrombectomy catheter. A 3. Versus focal moderate residual stenosis in the mid femoral vein treated using a 10 mm diameter by 6 cm percutaneous transluminal angioplasty at this site with a good angiographic result. There is marked improvement in flow with this point with minimal collateral filling     Ir Venogram Lower Extremity Right    Result Date: 4/24/2019  PROCEDURE: IR VENOGRAM EXTREMITY UNILATERAL MODERATE CONSCIOUS SEDATION 4/24/2019 HISTORY: ORDERING SYSTEM PROVIDED HISTORY: Deep vein thrombosis (DVT) of proximal vein of right lower extremity, unspecified chronicity ([facility] [address] TECHNOLOGIST PROVIDED HISTORY: Reason for exam:->Pain and Swelling +history of DVT and pulmonary emboli. TECHNIQUE: Procedure was explained in detail to the patient including the inherent risks. Patient understands and has given consent. Patient placed prone on the fluoroscopic table. Right popliteal space is interrogated. The lower popliteal vein is patent. Vena up more in the mid popliteal range there is thrombus which appears well organized in the mid popliteal vein. Skin and needle access site anesthetized with 2% lidocaine. 21 gauge needle advanced into the popliteal vein in an area without thrombus. Once accessed on 01/08 wire advanced through the access needle needle removed. 5 Nauruan coaxial dilator then advanced over the wire. Inner dilator wire removed and contrast instilled through this access for a right leg venogram.  This showed a such short segment of popliteal vein patent and then there is extensive irregular filling defect. This extends over a nearly 20 cm segment.   Glidewire inhaler  Inhale 2 puffs into the lungs every 6 hours as needed for Wheezing             aspirin 81 MG tablet  Take 81 mg by mouth daily              atorvastatin (LIPITOR) 10 MG tablet  Take 10 mg by mouth daily             diphenhydrAMINE HCl, Sleep, (UNISOM SLEEPGELS) 50 MG CAPS  Take 25 mg by mouth              famotidine (PEPCID) 20 MG tablet  Take 20 mg by mouth 2 times daily             Fexofenadine HCl (ALLEGRA ALLERGY PO)  Take by mouth daily              flecainide (TAMBOCOR) 100 MG tablet  Take 1 tablet by mouth 2 times daily             levothyroxine (SYNTHROID) 50 MCG tablet  Take 50 mcg by mouth Daily             metoprolol (TOPROL-XL) 25 MG XL tablet  Take 12.5 mg by mouth daily              montelukast (SINGULAIR) 10 MG tablet  Take 10 mg by mouth nightly             rivaroxaban (XARELTO) 20 MG TABS tablet  Take 20 mg by mouth daily Start after finishing Xarelto 15 mg BID x 3 weeks.              sildenafil (VIAGRA) 100 MG tablet  Take 100 mg by mouth as needed for Erectile Dysfunction                  Diet: DIET GENERAL;    Follow-up with Dr Zaheer Krishnamurthy in 3 days    Signed: Larisa Moore    Time spent on discharge 35 minutes

## 2019-05-14 ENCOUNTER — INITIAL CONSULT (OUTPATIENT)
Dept: PULMONOLOGY | Age: 64
End: 2019-05-14
Payer: COMMERCIAL

## 2019-05-14 VITALS
RESPIRATION RATE: 16 BRPM | HEIGHT: 73 IN | BODY MASS INDEX: 30.09 KG/M2 | OXYGEN SATURATION: 96 % | SYSTOLIC BLOOD PRESSURE: 130 MMHG | WEIGHT: 227 LBS | DIASTOLIC BLOOD PRESSURE: 68 MMHG | HEART RATE: 86 BPM

## 2019-05-14 DIAGNOSIS — E66.9 OBESITY (BMI 30.0-34.9): ICD-10-CM

## 2019-05-14 DIAGNOSIS — G47.33 OSA (OBSTRUCTIVE SLEEP APNEA): ICD-10-CM

## 2019-05-14 DIAGNOSIS — G47.19 EXCESSIVE DAYTIME SLEEPINESS: ICD-10-CM

## 2019-05-14 PROCEDURE — 3017F COLORECTAL CA SCREEN DOC REV: CPT | Performed by: INTERNAL MEDICINE

## 2019-05-14 PROCEDURE — G8417 CALC BMI ABV UP PARAM F/U: HCPCS | Performed by: INTERNAL MEDICINE

## 2019-05-14 PROCEDURE — 1111F DSCHRG MED/CURRENT MED MERGE: CPT | Performed by: INTERNAL MEDICINE

## 2019-05-14 PROCEDURE — 99214 OFFICE O/P EST MOD 30 MIN: CPT | Performed by: INTERNAL MEDICINE

## 2019-05-14 PROCEDURE — 1036F TOBACCO NON-USER: CPT | Performed by: INTERNAL MEDICINE

## 2019-05-14 PROCEDURE — G8427 DOCREV CUR MEDS BY ELIG CLIN: HCPCS | Performed by: INTERNAL MEDICINE

## 2019-05-14 RX ORDER — DIPHENHYDRAMINE HCL 25 MG
25 CAPSULE ORAL EVERY 6 HOURS PRN
COMMUNITY

## 2019-05-14 ASSESSMENT — ENCOUNTER SYMPTOMS
SHORTNESS OF BREATH: 0
EYE ITCHING: 0
EYE DISCHARGE: 0
ABDOMINAL DISTENTION: 0
COUGH: 0
ABDOMINAL PAIN: 0
BACK PAIN: 0

## 2019-05-14 NOTE — PROGRESS NOTES
Vinod Davis  1955  Referring Provider: Dr. Rosanna Lechuga:     Chief Complaint   Patient presents with    New Patient     CPAP f/u       HPI  Robert Novak is a 66 y.o. male has come back as a follow up. He had a HST and has been diagnosed with moderate SHAE. He is on a AutoCPAP which he is using it every night about 8 to 9 hours. He says that it is helping him. He is not waking up as before. He is not sleepy and tired during the day time. He has been using it for the last 6 weeks. He has a FFM. He has a few lbs since his last visit. His 2 week download data shows that his residual AHI is 8.1 and leak time is 1 min 4 secs and his 90th percentile pressure is 10.9. Current Outpatient Medications   Medication Sig Dispense Refill    diphenhydrAMINE (BENADRYL) 25 MG capsule Take 25 mg by mouth every 6 hours as needed for Itching      albuterol sulfate  (90 Base) MCG/ACT inhaler Inhale 2 puffs into the lungs every 6 hours as needed for Wheezing      flecainide (TAMBOCOR) 100 MG tablet Take 1 tablet by mouth 2 times daily 180 tablet 3    famotidine (PEPCID) 20 MG tablet Take 20 mg by mouth 2 times daily      montelukast (SINGULAIR) 10 MG tablet Take 10 mg by mouth nightly      atorvastatin (LIPITOR) 10 MG tablet Take 10 mg by mouth daily      levothyroxine (SYNTHROID) 50 MCG tablet Take 50 mcg by mouth Daily      metoprolol (TOPROL-XL) 25 MG XL tablet Take 12.5 mg by mouth daily       Fexofenadine HCl (ALLEGRA ALLERGY PO) Take by mouth daily       diphenhydrAMINE HCl, Sleep, (UNISOM SLEEPGELS) 50 MG CAPS Take 25 mg by mouth       sildenafil (VIAGRA) 100 MG tablet Take 100 mg by mouth as needed for Erectile Dysfunction      aspirin 81 MG tablet Take 81 mg by mouth daily        No current facility-administered medications for this visit.         Allergies   Allergen Reactions    Shellfish-Derived Products Anaphylaxis and Swelling    Other      \"the sticky stuff from EKG monitor pads burned my skin\"    Oxycodone Nausea Only    Tramadol Nausea Only     \       Past Medical History:   Diagnosis Date    Arthritis     hx osteoarthritis per old chart    Asthma     hx per old chart - listed as mild    Atrial fibrillation (HCC)     follow with Dr Domo Knox Environmental allergies     Factor 5 Leiden mutation, heterozygous ([address]     per pt on 4/23/2019\"recently dx with Factor 5 Leiden\" saw hematologist Dr Heather Romero- at [facility]\"    GERD (gastroesophageal reflux disease)     H/O cardiovascular stress test 12/21/2015    treadmill    H/O echocardiogram 6/9/2014    EF55% mild TR    H/O exercise stress test 02/05/2019    treadmill    History of echocardiogram 02/05/2019    EF 45-50% ABN, Normal left ventricular wall thickness, no regional wall motion abnnormalities were detected, diastolic dysfunction grade I, No pericardial effusion, no significant valvular disease    Hx of blood clots     \"had DVT( right) and PE 3/6/2019    Hx of cardiovascular stress test 3/16/2010    EF 63%. Normal stress Cardiolite perfusion study.  Hx of echocardiogram 12/21/2015    EF 50-55%. Normal chamber sizes. Normal LVSF but abnormal diastoliv function. Mild TR. Normal sized abdominal aorta at 1.9cm.  Hx of echocardiogram 3/16/2010    EF 55-60%. Normal size LV showing preserved global systolic function and no regional wall motion abnormalities. Normal size LA. Mildly dilated RV. Normal valves. No pericardial effusion.     Hyperlipidemia     Hypertension     PONV (postoperative nausea and vomiting)     \"with elbow surgery got sick\" - hx of motion sickness    Prolonged emergence from general anesthesia     \"with elbow surgery\"    Sleep apnea     hx per old chart- per pt had sleep study 2/2019- uses cpap    SOB (shortness of breath)     Thyroid disease     per old chart hx hypothyroid    Vertigo     hx per old chart/\"per pt on 4/23/2019\"that was a couple of yrs ago\"       Past Surgical History:   Procedure Laterality Date    CARDIAC CATHETERIZATION      20 years ago\"age 47 had cath and dx with atrial fib\"    COLONOSCOPY      age 50\"also age 56   [facility] ELBOW SURGERY  01/10/2012    Right elbow exploration , removal of loose body( per old chart hx ORIF right ulnar/radial shaft fx 2012) and also hx ulnar tunnel release right 2016    JOINT REPLACEMENT      per old chart total right hip 5/2017 and total left hip 8/2017    RHINOPLASTY      pe rold chart hx nasal fx ( in college)    TONSILLECTOMY  per old chart 5       Social History     Socioeconomic History    Marital status:      Spouse name: None    Number of children: None    Years of education: None    Highest education level: None   Occupational History    None   Social Needs    Financial resource strain: None    Food insecurity:     Worry: None     Inability: None    Transportation needs:     Medical: None     Non-medical: None   Tobacco Use    Smoking status: Never Smoker    Smokeless tobacco: Never Used   Substance and Sexual Activity    Alcohol use: No     Alcohol/week: 0.0 oz    Drug use: No    Sexual activity: Never   Lifestyle    Physical activity:     Days per week: None     Minutes per session: None    Stress: None   Relationships    Social connections:     Talks on phone: None     Gets together: None     Attends Restoration service: None     Active member of club or organization: None     Attends meetings of clubs or organizations: None     Relationship status: None    Intimate partner violence:     Fear of current or ex partner: None     Emotionally abused: None     Physically abused: None     Forced sexual activity: None   Other Topics Concern    None   Social History Narrative    None       Review of Systems   Constitutional: Negative for fatigue. HENT: Negative for congestion and postnasal drip. Eyes: Negative for discharge and itching. Respiratory: Negative for cough and shortness of breath.     Cardiovascular: Negative for chest pain and leg swelling. Gastrointestinal: Negative for abdominal distention and abdominal pain. Endocrine: Negative for cold intolerance and heat intolerance. Genitourinary: Negative for enuresis and genital sores. Musculoskeletal: Negative for arthralgias and back pain. Allergic/Immunologic: Negative for food allergies. Neurological: Negative for facial asymmetry and light-headedness. Hematological: Negative for adenopathy. Psychiatric/Behavioral: Negative for agitation and behavioral problems. Objective:   /68   Pulse 86   Resp 16   Ht 6' 1\" (1.854 m)   Wt 227 lb (103 kg)   SpO2 96%   BMI 29.95 kg/m²   Body mass index is 29.95 kg/m². Sleep Medicine 1/30/2019   Sitting and reading 3   Watching TV 3   Sitting, inactive in a public place (e.g. a theatre or a meeting) 1   As a passenger in a car for an hour without a break 2   Lying down to rest in the afternoon when circumstances permit 3   Sitting and talking to someone 1   Sitting quietly after a lunch without alcohol 3   In a car, while stopped for a few minutes in traffic 2   Total score 18   Neck circumference 16.25     {MALLAMPATI:3    Physical Exam   Constitutional: He is oriented to person, place, and time. He appears well-developed and well-nourished. Overweight   HENT:   Head: Normocephalic and atraumatic. Eyes: Pupils are equal, round, and reactive to light. EOM are normal.   Neck: Normal range of motion. Neck supple. Cardiovascular: Normal rate, regular rhythm and normal heart sounds. Pulmonary/Chest: Effort normal and breath sounds normal.   Abdominal: Soft. Bowel sounds are normal.   Musculoskeletal: Normal range of motion. Neurological: He is alert and oriented to person, place, and time. Skin: Skin is warm and dry. Psychiatric: He has a normal mood and affect. His behavior is normal.   Vitals reviewed.       Radiology: none    Assessment and Plan     Problem List        Pulmonary Problems    SHAE (obstructive sleep apnea)     Advised to be compliant with the CPAP  Loose weight            Other    Obesity (BMI 30.0-34. 9)     Advised to loose weight with diet and exercise           Excessive daytime sleepiness     Advised to be compliant with the CPAP  Loose weight                    Return in about 3 months (around 8/14/2019) for 2 week download data.      Progress notes sent to the referring Provider    Debby Beth MD  5/14/2019  3:26 PM

## 2019-08-03 RX ORDER — FLECAINIDE ACETATE 100 MG/1
100 TABLET ORAL 2 TIMES DAILY
Qty: 180 TABLET | Refills: 3 | Status: SHIPPED | OUTPATIENT
Start: 2019-08-03

## 2019-08-26 ENCOUNTER — OFFICE VISIT (OUTPATIENT)
Dept: PULMONOLOGY | Age: 64
End: 2019-08-26
Payer: COMMERCIAL

## 2019-08-26 VITALS
DIASTOLIC BLOOD PRESSURE: 60 MMHG | HEART RATE: 76 BPM | BODY MASS INDEX: 29.5 KG/M2 | OXYGEN SATURATION: 96 % | SYSTOLIC BLOOD PRESSURE: 92 MMHG | RESPIRATION RATE: 16 BRPM | WEIGHT: 223.6 LBS

## 2019-08-26 DIAGNOSIS — G47.19 EXCESSIVE DAYTIME SLEEPINESS: ICD-10-CM

## 2019-08-26 DIAGNOSIS — E66.9 OBESITY (BMI 30.0-34.9): ICD-10-CM

## 2019-08-26 DIAGNOSIS — G47.33 OSA (OBSTRUCTIVE SLEEP APNEA): ICD-10-CM

## 2019-08-26 PROCEDURE — G8427 DOCREV CUR MEDS BY ELIG CLIN: HCPCS | Performed by: INTERNAL MEDICINE

## 2019-08-26 PROCEDURE — G8417 CALC BMI ABV UP PARAM F/U: HCPCS | Performed by: INTERNAL MEDICINE

## 2019-08-26 PROCEDURE — 1036F TOBACCO NON-USER: CPT | Performed by: INTERNAL MEDICINE

## 2019-08-26 PROCEDURE — 3017F COLORECTAL CA SCREEN DOC REV: CPT | Performed by: INTERNAL MEDICINE

## 2019-08-26 PROCEDURE — 99214 OFFICE O/P EST MOD 30 MIN: CPT | Performed by: INTERNAL MEDICINE

## 2019-08-26 ASSESSMENT — ENCOUNTER SYMPTOMS
EYE DISCHARGE: 0
BACK PAIN: 0
COUGH: 0
EYE ITCHING: 0
ABDOMINAL DISTENTION: 0
ABDOMINAL PAIN: 0
SHORTNESS OF BREATH: 0

## 2019-09-05 ENCOUNTER — OFFICE VISIT (OUTPATIENT)
Dept: CARDIOLOGY CLINIC | Age: 64
End: 2019-09-05
Payer: COMMERCIAL

## 2019-09-05 VITALS
HEIGHT: 73 IN | DIASTOLIC BLOOD PRESSURE: 74 MMHG | SYSTOLIC BLOOD PRESSURE: 98 MMHG | BODY MASS INDEX: 29.69 KG/M2 | HEART RATE: 72 BPM | WEIGHT: 224 LBS

## 2019-09-05 DIAGNOSIS — E78.2 MIXED HYPERLIPIDEMIA: ICD-10-CM

## 2019-09-05 DIAGNOSIS — I82.411 DVT FEMORAL (DEEP VENOUS THROMBOSIS) WITH THROMBOPHLEBITIS, RIGHT (HCC): ICD-10-CM

## 2019-09-05 DIAGNOSIS — I48.91 ATRIAL FIBRILLATION, UNSPECIFIED TYPE (HCC): Primary | ICD-10-CM

## 2019-09-05 DIAGNOSIS — I10 ESSENTIAL HYPERTENSION: ICD-10-CM

## 2019-09-05 DIAGNOSIS — G47.33 OSA (OBSTRUCTIVE SLEEP APNEA): ICD-10-CM

## 2019-09-05 PROCEDURE — 99213 OFFICE O/P EST LOW 20 MIN: CPT | Performed by: NURSE PRACTITIONER

## 2019-09-05 PROCEDURE — 3017F COLORECTAL CA SCREEN DOC REV: CPT | Performed by: NURSE PRACTITIONER

## 2019-09-05 PROCEDURE — G8417 CALC BMI ABV UP PARAM F/U: HCPCS | Performed by: NURSE PRACTITIONER

## 2019-09-05 PROCEDURE — G8427 DOCREV CUR MEDS BY ELIG CLIN: HCPCS | Performed by: NURSE PRACTITIONER

## 2019-09-05 PROCEDURE — 1036F TOBACCO NON-USER: CPT | Performed by: NURSE PRACTITIONER

## 2019-09-05 NOTE — PROGRESS NOTES
· Constitutional: No Fever,no unintentional weight Loss   · Eyes: No change in Vision:     · ENT: No Headaches, Hearing Loss or Vertigo. No tinnitus   · Cardiovascular: as per note above   · Respiratory: No cough or wheezing and as per note above. · Gastrointestinal: no abdominal pain, no appetite loss, no blood in stools, constipation, or diarrhea, No heartburn  · Genitourinary: No dysuria, trouble voiding, or hematuria  · Musculoskeletal: back pain- No: myalgia No,  Arthralgia- No  · Integumentary: No rash or pruritis  · Neurological: No TIA or stroke symptoms  · Psychiatric: Anxiety- No: depression-  No   · Endocrine: No malaise, No fatigue - no temperature intolerance  · Hematologic/Lymphatic: No bleeding problems, blood clots or swollen lymph nodes  · Allergic/Immunologic: No nasal congestion or hives    Objective:      Physical Exam:  BP 98/74   Pulse 72   Ht 6' 1\" (1.854 m)   Wt 224 lb (101.6 kg)   BMI 29.55 kg/m²   Wt Readings from Last 3 Encounters:   09/05/19 224 lb (101.6 kg)   08/26/19 223 lb 9.6 oz (101.4 kg)   05/14/19 227 lb (103 kg)     Body mass index is 29.55 kg/m². GENERAL - Alert, oriented, pleasant, in no apparent distress. Head unremarkable  Eyes - pupils equal and reactive to light - bilateral conjunctiva are pink: sclera are white   ENT - external ears intact, nose is intact:  tongue is midline pink and moist  Neck - No jugular venous distention noted. No carotid bruits appreciated. Cardiovascular - Normal S1 and S2:  no murmur appreciated, No gallop. Regular rate- Yes,  rhythm regular-Yes. Extremities - No cyanosis, clubbing, no edema to lower legs. Pulmonary - No respiratory distress. No wheezes or rales. Chest is clear  Pulses: Bilateral radial pulses normal  Abdomen -  Soft no tenderness, non distended   Musculoskeletal - Normal movement of all extremities   Neurologic - alert and oriented: There are no gross focal neurologic abnormalities.    Skin-  No rash: No

## 2019-09-06 ENCOUNTER — TELEPHONE (OUTPATIENT)
Dept: PULMONOLOGY | Age: 64
End: 2019-09-06

## 2019-09-06 NOTE — TELEPHONE ENCOUNTER
Pt just wanted to let you know, his original tubing for his cpap is smaller than what he has been getting. He states when he put the larger hose on, it pushes more air, he states it goes up to 17 when normally with the smaller tubing its around 7-12. He states he woke up at 3am this morning because he was SOB and felt sick from the air coming through. He switched the hose to the smaller one and was able to go back to sleep. He would also like to know is it normal to be SOB like that?

## 2020-02-21 ENCOUNTER — OFFICE VISIT (OUTPATIENT)
Dept: CARDIOLOGY CLINIC | Age: 65
End: 2020-02-21
Payer: COMMERCIAL

## 2020-02-21 VITALS
HEIGHT: 72 IN | SYSTOLIC BLOOD PRESSURE: 112 MMHG | DIASTOLIC BLOOD PRESSURE: 70 MMHG | BODY MASS INDEX: 30.88 KG/M2 | HEART RATE: 64 BPM | WEIGHT: 228 LBS

## 2020-02-21 PROBLEM — I26.92 SADDLE EMBOLUS OF PULMONARY ARTERY WITHOUT ACUTE COR PULMONALE (HCC): Status: ACTIVE | Noted: 2020-02-21

## 2020-02-21 PROCEDURE — G8484 FLU IMMUNIZE NO ADMIN: HCPCS | Performed by: INTERNAL MEDICINE

## 2020-02-21 PROCEDURE — 3017F COLORECTAL CA SCREEN DOC REV: CPT | Performed by: INTERNAL MEDICINE

## 2020-02-21 PROCEDURE — 1036F TOBACCO NON-USER: CPT | Performed by: INTERNAL MEDICINE

## 2020-02-21 PROCEDURE — G8427 DOCREV CUR MEDS BY ELIG CLIN: HCPCS | Performed by: INTERNAL MEDICINE

## 2020-02-21 PROCEDURE — 99213 OFFICE O/P EST LOW 20 MIN: CPT | Performed by: INTERNAL MEDICINE

## 2020-02-21 PROCEDURE — G8417 CALC BMI ABV UP PARAM F/U: HCPCS | Performed by: INTERNAL MEDICINE

## 2020-02-21 NOTE — PROGRESS NOTES
OFFICE PROGRESS NOTES      Sadie Fay is a 60 y.o. male who has    CHIEF COMPLAINT AS FOLLOWS:  CHEST PAIN: Patient denies any C/O chest pains at this time. SOB: No C/O SOB at this time. LEG EDEMA: No leg edema   PALPITATIONS: Denies any C/O Palpitations   DIZZINESS: No C/O Dizziness   SYNCOPE: None   OTHER:                                     HPI: Patient is here for F/U on his PAF, HTN & Dyslipidemia problems. He does not have any complaints at this time.     Liat Patten has the following history recorded in care path:  Patient Active Problem List    Diagnosis Date Noted    Saddle embolus of pulmonary artery without acute cor pulmonale ([facility] [address] 02/21/2020    DVT femoral (deep venous thrombosis) with thrombophlebitis, right (HCC) 04/24/2019    Acute deep vein thrombosis (DVT) of right lower extremity (HCC) 04/24/2019    Shortness of breath 02/21/2019    Obesity (BMI 30.0-34.9) 01/30/2019    SHAE (obstructive sleep apnea) 01/30/2019    Excessive daytime sleepiness 01/30/2019    Atrial fibrillation (HCC)     Hyperlipidemia     GERD (gastroesophageal reflux disease)     Hypertension     Radial head fracture 01/10/2012     Current Outpatient Medications   Medication Sig Dispense Refill    apixaban (ELIQUIS) 5 MG TABS tablet Take 5 mg by mouth 2 times daily      flecainide (TAMBOCOR) 100 MG tablet Take 1 tablet by mouth 2 times daily 180 tablet 3    diphenhydrAMINE (BENADRYL) 25 MG capsule Take 25 mg by mouth every 6 hours as needed for Itching      albuterol sulfate  (90 Base) MCG/ACT inhaler Inhale 2 puffs into the lungs every 6 hours as needed for Wheezing      famotidine (PEPCID) 20 MG tablet Take 20 mg by mouth 2 times daily      montelukast (SINGULAIR) 10 MG tablet Take 10 mg by mouth nightly      atorvastatin (LIPITOR) 10 MG tablet Take 10 mg by mouth daily      levothyroxine (SYNTHROID) 50 MCG tablet Take 50 mcg by mouth Daily      metoprolol (TOPROL-XL) 25 MG XL tablet Take 12.5 mg by mouth daily       sildenafil (VIAGRA) 100 MG tablet Take 100 mg by mouth as needed for Erectile Dysfunction      Fexofenadine HCl (ALLEGRA ALLERGY PO) Take by mouth daily        No current facility-administered medications for this visit. Allergies: Shellfish-derived products; Other; Oxycodone; and Tramadol  Past Medical History:   Diagnosis Date    Arthritis     hx osteoarthritis per old chart    Asthma     hx per old chart - listed as mild    Atrial fibrillation (HCC)     follow with Dr Boo Smallwood Environmental allergies     Factor 5 Leiden mutation, heterozygous ([facility] [place] [address]     per pt on 4/23/2019\"recently dx with Factor 5 Leiden\" saw hematologist Dr Gabi Neri- at [facility]\"    GERD (gastroesophageal reflux disease)     H/O cardiovascular stress test 12/21/2015    treadmill    H/O echocardiogram 6/9/2014    EF55% mild TR    H/O exercise stress test 02/05/2019    treadmill    History of echocardiogram 02/05/2019    EF 45-50% ABN, Normal left ventricular wall thickness, no regional wall motion abnnormalities were detected, diastolic dysfunction grade I, No pericardial effusion, no significant valvular disease    Hx of blood clots     \"had DVT( right) and PE 3/6/2019    Hx of cardiovascular stress test 3/16/2010    EF 63%. Normal stress Cardiolite perfusion study.  Hx of echocardiogram 12/21/2015    EF 50-55%. Normal chamber sizes. Normal LVSF but abnormal diastoliv function. Mild TR. Normal sized abdominal aorta at 1.9cm.  Hx of echocardiogram 3/16/2010    EF 55-60%. Normal size LV showing preserved global systolic function and no regional wall motion abnormalities. Normal size LA. Mildly dilated RV. Normal valves. No pericardial effusion.     Hyperlipidemia     Hypertension     PONV (postoperative nausea and vomiting)     \"with elbow surgery got sick\" - hx of motion sickness    Prolonged emergence from general loss, numbness/tingling, visual changes, syncope  Dermatological: Negative for rash    Objective:  /70   Pulse 64   Ht 6' (1.829 m)   Wt 228 lb (103.4 kg)   BMI 30.92 kg/m²   Wt Readings from Last 3 Encounters:   02/21/20 228 lb (103.4 kg)   09/05/19 224 lb (101.6 kg)   08/26/19 223 lb 9.6 oz (101.4 kg)     Body mass index is 30.92 kg/m². GENERAL - Alert, oriented, pleasant, in no apparent distress. EYES: No jaundice, no conjunctival pallor. SKIN: It is warm & dry. No rashes. No Echhymosis    HEENT - No clinically significant abnormalities seen. Neck - Supple. No jugular venous distention noted. No carotid bruits. Cardiovascular - Normal S1 and S2 without obvious murmur or gallop. Extremities - No cyanosis, clubbing, or significant edema. Pulmonary - No respiratory distress. No wheezes or rales. Abdomen - No masses, tenderness, or organomegaly. Musculoskeletal - No significant edema. No joint deformities. No muscle wasting. Neurologic - Cranial nerves II through XII are grossly intact. There were no gross focal neurologic abnormalities.     Lab Review   No results found for: CKTOTAL, CKMB, CKMBINDEX, TROPONINT  BNP:  No results found for: BNP  PT/INR:    Lab Results   Component Value Date    INR 1.68 04/24/2019     No results found for: LABA1C  Lab Results   Component Value Date    WBC 4.4 04/24/2019    WBC 7.8 03/08/2017    HCT 45.0 04/24/2019    HCT 47.6 03/08/2017    MCV 97.2 04/24/2019    MCV 93.6 03/08/2017     04/24/2019     03/08/2017     Lab Results   Component Value Date    CHOL 184 03/08/2017    TRIG 83 03/08/2017    HDL 51 03/08/2017    LDLCALC 116 03/08/2017     Lab Results   Component Value Date    ALT 21 03/08/2017    AST 21 03/08/2017     BMP:    Lab Results   Component Value Date     03/08/2017     01/09/2012    K 4.2 03/08/2017    K 4.3 01/09/2012     03/08/2017     01/09/2012    CO2 22 03/08/2017    CO2 29 01/09/2012    BUN 16 04/24/2019    BUN 20 03/08/2017    CREATININE 1.2 04/24/2019    CREATININE 1.3 03/08/2017     CMP:   Lab Results   Component Value Date     03/08/2017     01/09/2012    K 4.2 03/08/2017    K 4.3 01/09/2012     03/08/2017     01/09/2012    CO2 22 03/08/2017    CO2 29 01/09/2012    BUN 16 04/24/2019    BUN 20 03/08/2017     TSH:  No results found for: [facility]    QUALITY MEASURES REVIEWED:  1.CAD:Patient is taking anti platelet agent:No  2. DYSLIPIDEMIA: Patient is on cholesterol lowering medication:Yes  3. Beta-Blocker therapy for CAD, if prior Myocardial Infarction:Yes  4. Atrial fibrillation & anticoagulation therapy Yes  5. Discussed weight management strategies. Impression:    1. Atrial fibrillation, unspecified type ([facility] [address]    2. Mixed hyperlipidemia    3. Gastroesophageal reflux disease without esophagitis    4. Essential hypertension    5. Obesity (BMI 30.0-34.9)    6. SHAE (obstructive sleep apnea)    7. DVT femoral (deep venous thrombosis) with thrombophlebitis, right ([facility] [address]    8. Saddle embolus of pulmonary artery without acute cor pulmonale, unspecified chronicity (HCC)       Patient Active Problem List   Diagnosis Code    Radial head fracture S52.123A    Atrial fibrillation (HCC) I48.91    Hyperlipidemia E78.5    GERD (gastroesophageal reflux disease) K21.9    Hypertension I10    Obesity (BMI 30.0-34. 9) E66.9    SHAE (obstructive sleep apnea) G47.33    Excessive daytime sleepiness G47.19    Shortness of breath R06.02    DVT femoral (deep venous thrombosis) with thrombophlebitis, right ([facility]) I82.411    Acute deep vein thrombosis (DVT) of right lower extremity ([facility]) I82.401    Saddle embolus of pulmonary artery without acute cor pulmonale ([facility]) I26.92       Assessment & Plan:    CAD:No  HTN:well controlled on current medical regimen, see list above.            - changes in  treatment:   no   CARDIOMYOPATHY: None known   CONGESTIVE HEART FAILURE: NO KNOWN HISTORY.   VHD: No

## 2020-02-21 NOTE — PROGRESS NOTES
[id number] Score for Atrial Fibrillation Stroke Risk   Risk   Factors  Component Value   C CHF No 0   H HTN Yes 1   A2 Age >= 76 No,  (66 y.o.) 0   D DM No 0   S2 Prior Stroke/TIA No 0   V Vascular Disease No 0   A Age 74-69 No,  (66 y.o.) 0   Sc Sex male 0    [id number]  Score  1   Score last updated 9/52/43 8:58 AM    Click here for a link to the UpToDate guideline \"Atrial Fibrillation: Anticoagulation therapy to prevent embolization    Disclaimer: Risk Score calculation is dependent on accuracy of patient problem list and past encounter diagnosis.

## 2020-03-21 ENCOUNTER — NURSE TRIAGE (OUTPATIENT)
Dept: OTHER | Facility: CLINIC | Age: 65
End: 2020-03-21

## 2020-08-12 ENCOUNTER — TELEPHONE (OUTPATIENT)
Dept: CARDIOLOGY CLINIC | Age: 65
End: 2020-08-12

## 2020-08-12 NOTE — TELEPHONE ENCOUNTER
Patient needs cardiac clearance for shoulder   Surgery Dr Deep Hobbs surgery was to be 8/13  It is now on hold till they receive clearance   Patient to have faxed over

## 2020-08-13 ENCOUNTER — OFFICE VISIT (OUTPATIENT)
Dept: CARDIOLOGY CLINIC | Age: 65
End: 2020-08-13
Payer: COMMERCIAL

## 2020-08-13 VITALS
DIASTOLIC BLOOD PRESSURE: 80 MMHG | WEIGHT: 221.8 LBS | HEART RATE: 61 BPM | HEIGHT: 73 IN | BODY MASS INDEX: 29.4 KG/M2 | SYSTOLIC BLOOD PRESSURE: 100 MMHG

## 2020-08-13 PROCEDURE — G8417 CALC BMI ABV UP PARAM F/U: HCPCS | Performed by: INTERNAL MEDICINE

## 2020-08-13 PROCEDURE — 99214 OFFICE O/P EST MOD 30 MIN: CPT | Performed by: INTERNAL MEDICINE

## 2020-08-13 PROCEDURE — 3017F COLORECTAL CA SCREEN DOC REV: CPT | Performed by: INTERNAL MEDICINE

## 2020-08-13 PROCEDURE — 1036F TOBACCO NON-USER: CPT | Performed by: INTERNAL MEDICINE

## 2020-08-13 PROCEDURE — 93000 ELECTROCARDIOGRAM COMPLETE: CPT | Performed by: INTERNAL MEDICINE

## 2020-08-13 PROCEDURE — G8427 DOCREV CUR MEDS BY ELIG CLIN: HCPCS | Performed by: INTERNAL MEDICINE

## 2020-08-13 NOTE — LETTER
[address] [facility]  [address] [address]  Phone: [phone]  Fax: [phone]    Deedee La MD        August 13, 2020     [facility] Venicesharon Hernandez MD  [address] [place] [address]    Patient: Jacob Meneses  MR Number: [id number]  YOB: 1955  Date of Visit: 8/13/2020    Dear Dr. Eden Lopez: Thank you for the request for consultation for Elba uQiñones to me for the evaluation of PAF & H/O PE. Below are the relevant portions of my assessment and plan of care. If you have questions, please do not hesitate to call me. I look forward to following Serenity Payton along with you.     Sincerely,        Deedee La MD

## 2020-08-13 NOTE — PROGRESS NOTES
OFFICE PROGRESS NOTES      Kristal Zurita is a 63 y.o. male who has    CHIEF COMPLAINT AS FOLLOWS:  CHEST PAIN: Patient denies any C/O chest pains at this time. SOB: No C/O SOB at this time. LEG EDEMA: No leg edema   PALPITATIONS: Denies any C/O Palpitations   DIZZINESS: No C/O Dizziness   SYNCOPE: None   OTHER:                                     HPI: Patient is here for F/U on his PAF, PE,   HTN & Dyslipidemia problems. He does not have any complaints at this time.     Elta Daily has the following history recorded in care path:  Patient Active Problem List    Diagnosis Date Noted    Saddle embolus of pulmonary artery without acute cor pulmonale ([facility] [address] 02/21/2020    DVT femoral (deep venous thrombosis) with thrombophlebitis, right (HCC) 04/24/2019    Acute deep vein thrombosis (DVT) of right lower extremity (HCC) 04/24/2019    Shortness of breath 02/21/2019    Obesity (BMI 30.0-34.9) 01/30/2019    SHAE (obstructive sleep apnea) 01/30/2019    Excessive daytime sleepiness 01/30/2019    Atrial fibrillation (HCC)     Hyperlipidemia     GERD (gastroesophageal reflux disease)     Hypertension     Radial head fracture 01/10/2012     Current Outpatient Medications   Medication Sig Dispense Refill    apixaban (ELIQUIS) 5 MG TABS tablet Take 5 mg by mouth 2 times daily      flecainide (TAMBOCOR) 100 MG tablet Take 1 tablet by mouth 2 times daily 180 tablet 3    diphenhydrAMINE (BENADRYL) 25 MG capsule Take 25 mg by mouth every 6 hours as needed for Itching      albuterol sulfate  (90 Base) MCG/ACT inhaler Inhale 2 puffs into the lungs every 6 hours as needed for Wheezing      famotidine (PEPCID) 20 MG tablet Take 20 mg by mouth 2 times daily      montelukast (SINGULAIR) 10 MG tablet Take 10 mg by mouth nightly      atorvastatin (LIPITOR) 10 MG tablet Take 10 mg by mouth daily      levothyroxine (SYNTHROID) 50 MCG tablet Take 50 mcg by mouth Daily      metoprolol (TOPROL-XL) 25 MG XL tablet Take 12.5 mg by mouth daily       Fexofenadine HCl (ALLEGRA ALLERGY PO) Take by mouth daily        No current facility-administered medications for this visit. Allergies: Shellfish-derived products; Other; Oxycodone; and Tramadol  Past Medical History:   Diagnosis Date    Arthritis     hx osteoarthritis per old chart    Asthma     hx per old chart - listed as mild    Atrial fibrillation (HCC)     follow with Dr Nelia Garza Environmental allergies     Factor 5 Leiden mutation, heterozygous ([address]     per pt on 4/23/2019\"recently dx with Factor 5 Leiden\" saw hematologist Dr Cathie Tellez- at [facility]\"    GERD (gastroesophageal reflux disease)     H/O cardiovascular stress test 12/21/2015    treadmill    H/O echocardiogram 6/9/2014    EF55% mild TR    H/O exercise stress test 02/05/2019    treadmill    History of echocardiogram 02/05/2019    EF 45-50% ABN, Normal left ventricular wall thickness, no regional wall motion abnnormalities were detected, diastolic dysfunction grade I, No pericardial effusion, no significant valvular disease    Hx of blood clots     \"had DVT( right) and PE 3/6/2019    Hx of cardiovascular stress test 3/16/2010    EF 63%. Normal stress Cardiolite perfusion study.  Hx of echocardiogram 12/21/2015    EF 50-55%. Normal chamber sizes. Normal LVSF but abnormal diastoliv function. Mild TR. Normal sized abdominal aorta at 1.9cm.  Hx of echocardiogram 3/16/2010    EF 55-60%. Normal size LV showing preserved global systolic function and no regional wall motion abnormalities. Normal size LA. Mildly dilated RV. Normal valves. No pericardial effusion.     Hyperlipidemia     Hypertension     PONV (postoperative nausea and vomiting)     \"with elbow surgery got sick\" - hx of motion sickness    Prolonged emergence from general anesthesia     \"with elbow surgery\"    Sleep apnea     hx per old chart- per pt had sleep study 2/2019- uses cpap    SOB (shortness of breath)     Thyroid disease     per old chart hx hypothyroid    Vertigo     hx per old chart/\"per pt on 4/23/2019\"that was a couple of yrs ago\"     Past Surgical History:   Procedure Laterality Date    CARDIAC CATHETERIZATION      20 years ago\"age 47 had cath and dx with atrial fib\"    COLONOSCOPY      age 50\"also age 56   Elena Langton ELBOW SURGERY  01/10/2012    Right elbow exploration , removal of loose body( per old chart hx ORIF right ulnar/radial shaft fx 2012) and also hx ulnar tunnel release right 2016    JOINT REPLACEMENT      per old chart total right hip 5/2017 and total left hip 8/2017    RHINOPLASTY      pe rold chart hx nasal fx ( in college)    TONSILLECTOMY  per old chart 1967      As reviewed   Family History   Problem Relation Age of Onset    High Blood Pressure Mother     Diabetes Father     High Blood Pressure Father      Social History     Tobacco Use    Smoking status: Never Smoker    Smokeless tobacco: Never Used   Substance Use Topics    Alcohol use: No     Alcohol/week: 0.0 standard drinks      Review of Systems:    Constitutional: Negative for diaphoresis and fatigue  Psychological:Negative for anxiety or depression  HENT: Negative for headaches, nasal congestion, sinus pain or vertigo  Eyes: Negative for visual disturbance.    Endocrine: Negative for polydipsia/polyuria  Respiratory: Negative for shortness of breath  Cardiovascular: Negative for chest pain, dyspnea on exertion, claudication, edema, irregular heartbeat, murmur, palpitations or shortness of breath  Gastrointestinal: Negative for abdominal pain or heartburn  Genito-Urinary: Negative for urinary frequency/urgency  Musculoskeletal: Negative for muscle pain, muscular weakness, negative for pain in arm and leg or swelling in foot and leg  Neurological: Negative for dizziness, headaches, memory loss, numbness/tingling, visual changes, syncope  Dermatological: Negative for rash    Objective:  /80   Pulse 61   Ht 6' 1\" (1.854 m)   Wt 221 lb 12.8 oz (100.6 kg)   BMI 29.26 kg/m²   Wt Readings from Last 3 Encounters:   08/13/20 221 lb 12.8 oz (100.6 kg)   02/21/20 228 lb (103.4 kg)   09/05/19 224 lb (101.6 kg)     Body mass index is 29.26 kg/m². No flowsheet data found. Vitals:    08/13/20 1335   BP: 100/80   Pulse: 61   Weight: 221 lb 12.8 oz (100.6 kg)   Height: 6' 1\" (1.854 m)      GENERAL - Alert, oriented, pleasant, in no apparent distress. EYES: No jaundice, no conjunctival pallor. SKIN: It is warm & dry. No rashes. No Echhymosis    HEENT - No clinically significant abnormalities seen. Neck - Supple. No jugular venous distention noted. No carotid bruits. Cardiovascular - Normal S1 and S2 without obvious murmur or gallop. Extremities - No cyanosis, clubbing, or significant edema. Pulmonary - No respiratory distress. No wheezes or rales. Abdomen - No masses, tenderness, or organomegaly. Musculoskeletal - No significant edema. No joint deformities. No muscle wasting. Neurologic - Cranial nerves II through XII are grossly intact. There were no gross focal neurologic abnormalities.     Lab Review   No results found for: CKTOTAL, CKMB, CKMBINDEX, TROPONINT  BNP:  No results found for: BNP  PT/INR:    Lab Results   Component Value Date    INR 1.68 04/24/2019     No results found for: LABA1C  Lab Results   Component Value Date    WBC 4.4 04/24/2019    WBC 7.8 03/08/2017    HCT 45.0 04/24/2019    HCT 47.6 03/08/2017    MCV 97.2 04/24/2019    MCV 93.6 03/08/2017     04/24/2019     03/08/2017     Lab Results   Component Value Date    CHOL 184 03/08/2017    TRIG 83 03/08/2017    HDL 51 03/08/2017    LDLCALC 116 03/08/2017     Lab Results   Component Value Date    ALT 21 03/08/2017    AST 21 03/08/2017     BMP:    Lab Results   Component Value Date     03/08/2017     01/09/2012    K 4.2 03/08/2017    K 4.3 01/09/2012     03/08/2017 RELEVANT DIAGNOSIS:as noted in patient's active problem list:  Saddle Embolus: Patient had DVT requiring thrombolysis. SHAE: on C-Pap  TESTS ORDERED: None this visit                                   All previously ordered tests reviewed. ARRHYTHMIAS:  known                                Patient has H/O Atrial fibrillation                                He is rate controlled & on anticoagulation. MEDICATIONS: CPM. Patient is considered a medium risk candidate for surgery mainly because of his factor 5 Leiden problem  With H/O DVT & PE as well as A-fib. He does not have known CAD problem. Bridging need to be considered. Office f/u in six months. Primary/secondary prevention is the goal by aggressive risk modification, healthy and therapeutic life style changes for cardiovascular risk reduction. Various goals are discussed and multiple questions answered.

## 2020-08-13 NOTE — PROGRESS NOTES
[id number] Score for Atrial Fibrillation Stroke Risk   Risk   Factors  Component Value   C CHF No 0   H HTN Yes 1   A2 Age >= 76 No,  (66 y.o.) 0   D DM No 0   S2 Prior Stroke/TIA No 0   V Vascular Disease No 0   A Age 74-69 No,  (66 y.o.) 0   Sc Sex male 0    [id number]  Score  1   Score last updated 8/13/20 7:13 PM EDT    Click here for a link to the UpToDate guideline \"Atrial Fibrillation: Anticoagulation therapy to prevent embolization    Disclaimer: Risk Score calculation is dependent on accuracy of patient problem list and past encounter diagnosis.

## 2020-09-04 ENCOUNTER — OFFICE VISIT (OUTPATIENT)
Dept: PULMONOLOGY | Age: 65
End: 2020-09-04
Payer: COMMERCIAL

## 2020-09-04 VITALS
HEIGHT: 73 IN | OXYGEN SATURATION: 97 % | SYSTOLIC BLOOD PRESSURE: 120 MMHG | WEIGHT: 222.2 LBS | BODY MASS INDEX: 29.45 KG/M2 | HEART RATE: 75 BPM | DIASTOLIC BLOOD PRESSURE: 80 MMHG

## 2020-09-04 PROCEDURE — G8427 DOCREV CUR MEDS BY ELIG CLIN: HCPCS | Performed by: INTERNAL MEDICINE

## 2020-09-04 PROCEDURE — 99243 OFF/OP CNSLTJ NEW/EST LOW 30: CPT | Performed by: INTERNAL MEDICINE

## 2020-09-04 PROCEDURE — G8417 CALC BMI ABV UP PARAM F/U: HCPCS | Performed by: INTERNAL MEDICINE

## 2020-09-04 ASSESSMENT — ENCOUNTER SYMPTOMS
SHORTNESS OF BREATH: 0
COUGH: 0
BACK PAIN: 0
EYE ITCHING: 0
ABDOMINAL PAIN: 0
EYE DISCHARGE: 0
ABDOMINAL DISTENTION: 0

## 2020-09-04 NOTE — PROGRESS NOTES
Anyi Davis  1955  Referring Provider: Dr. Dwight Rodriguez    Subjective:     Chief Complaint   Patient presents with    Sleep Apnea    Other     RLS ? HPI  Torrie Lundborg is a 63 y.o. male has come back as a follow up after 1 year. He has moderate SHAE. He is on Auto CPAP which he is using it every night about 8 to 9 hours. He says that it is helping him. He has no loss of weight. He is not rarely sleepy during the day time. His 2 week download data showed that he has residual AHI of 6.6 and leak of 1 min 47 secs. Current Outpatient Medications   Medication Sig Dispense Refill    apixaban (ELIQUIS) 5 MG TABS tablet Take 5 mg by mouth 2 times daily      flecainide (TAMBOCOR) 100 MG tablet Take 1 tablet by mouth 2 times daily 180 tablet 3    diphenhydrAMINE (BENADRYL) 25 MG capsule Take 25 mg by mouth every 6 hours as needed for Itching      albuterol sulfate  (90 Base) MCG/ACT inhaler Inhale 2 puffs into the lungs every 6 hours as needed for Wheezing      famotidine (PEPCID) 20 MG tablet Take 20 mg by mouth 2 times daily      montelukast (SINGULAIR) 10 MG tablet Take 10 mg by mouth nightly      atorvastatin (LIPITOR) 10 MG tablet Take 10 mg by mouth daily      levothyroxine (SYNTHROID) 50 MCG tablet Take 50 mcg by mouth Daily      metoprolol (TOPROL-XL) 25 MG XL tablet Take 12.5 mg by mouth daily       Fexofenadine HCl (ALLEGRA ALLERGY PO) Take by mouth daily        No current facility-administered medications for this visit.         Allergies   Allergen Reactions    Shellfish-Derived Products Anaphylaxis and Swelling    Other      \"the sticky stuff from EKG monitor pads burned my skin\"    Oxycodone Nausea Only    Tramadol Nausea Only     \       Past Medical History:   Diagnosis Date    Arthritis     hx osteoarthritis per old chart    Asthma     hx per old chart - listed as mild    Atrial fibrillation ([facility] [address]     follow with Dr Santino Valadez Environmental allergies     Factor 5 Leiden mutation, heterozygous ([address]     per pt on 4/23/2019\"recently dx with Factor 5 Leiden\" saw hematologist Dr Vicenta Castañeda- at [facility]\"    GERD (gastroesophageal reflux disease)     H/O cardiovascular stress test 12/21/2015    treadmill    H/O echocardiogram 6/9/2014    EF55% mild TR    H/O exercise stress test 02/05/2019    treadmill    History of echocardiogram 02/05/2019    EF 45-50% ABN, Normal left ventricular wall thickness, no regional wall motion abnnormalities were detected, diastolic dysfunction grade I, No pericardial effusion, no significant valvular disease    Hx of blood clots     \"had DVT( right) and PE 3/6/2019    Hx of cardiovascular stress test 3/16/2010    EF 63%. Normal stress Cardiolite perfusion study.  Hx of echocardiogram 12/21/2015    EF 50-55%. Normal chamber sizes. Normal LVSF but abnormal diastoliv function. Mild TR. Normal sized abdominal aorta at 1.9cm.  Hx of echocardiogram 3/16/2010    EF 55-60%. Normal size LV showing preserved global systolic function and no regional wall motion abnormalities. Normal size LA. Mildly dilated RV. Normal valves. No pericardial effusion.     Hyperlipidemia     Hypertension     PONV (postoperative nausea and vomiting)     \"with elbow surgery got sick\" - hx of motion sickness    Prolonged emergence from general anesthesia     \"with elbow surgery\"    Sleep apnea     hx per old chart- per pt had sleep study 2/2019- uses cpap    SOB (shortness of breath)     Thyroid disease     per old chart hx hypothyroid    Vertigo     hx per old chart/\"per pt on 4/23/2019\"that was a couple of yrs ago\"       Past Surgical History:   Procedure Laterality Date    CARDIAC CATHETERIZATION      20 years ago\"age 47 had cath and dx with atrial fib\"    COLONOSCOPY      age 50\"also age 56   [facility] ELBOW SURGERY  01/10/2012    Right elbow exploration , removal of loose body( per old chart hx ORIF right ulnar/radial shaft fx 2012) and also hx ulnar tunnel release right 2016    JOINT REPLACEMENT      per old chart total right hip 5/2017 and total left hip 8/2017    RHINOPLASTY      pe rold chart hx nasal fx ( in college)    TONSILLECTOMY  per old chart 1967       Social History     Socioeconomic History    Marital status:      Spouse name: None    Number of children: None    Years of education: None    Highest education level: None   Occupational History    None   Social Needs    Financial resource strain: None    Food insecurity     Worry: None     Inability: None    Transportation needs     Medical: None     Non-medical: None   Tobacco Use    Smoking status: Never Smoker    Smokeless tobacco: Never Used   Substance and Sexual Activity    Alcohol use: No     Alcohol/week: 0.0 standard drinks    Drug use: No    Sexual activity: Never   Lifestyle    Physical activity     Days per week: None     Minutes per session: None    Stress: None   Relationships    Social connections     Talks on phone: None     Gets together: None     Attends Synagogue service: None     Active member of club or organization: None     Attends meetings of clubs or organizations: None     Relationship status: None    Intimate partner violence     Fear of current or ex partner: None     Emotionally abused: None     Physically abused: None     Forced sexual activity: None   Other Topics Concern    None   Social History Narrative    None       Review of Systems   Constitutional: Negative for fatigue. HENT: Negative for congestion and postnasal drip. Eyes: Negative for discharge and itching. Respiratory: Negative for cough and shortness of breath. Cardiovascular: Negative for chest pain and leg swelling. Gastrointestinal: Negative for abdominal distention and abdominal pain. Endocrine: Negative for cold intolerance and heat intolerance. Genitourinary: Negative for enuresis and frequency. Musculoskeletal: Negative for arthralgias and back pain.    Allergic/Immunologic: Negative for environmental allergies and food allergies. Neurological: Negative for light-headedness and headaches. Hematological: Negative for adenopathy. Psychiatric/Behavioral: Negative for agitation and behavioral problems. Objective:   /80   Pulse 75   Ht 6' 1\" (1.854 m)   Wt 222 lb 3.2 oz (100.8 kg)   SpO2 97%   BMI 29.32 kg/m²   Body mass index is 29.32 kg/m². Sleep Medicine 1/30/2019   Sitting and reading 3   Watching TV 3   Sitting, inactive in a public place (e.g. a theatre or a meeting) 1   As a passenger in a car for an hour without a break 2   Lying down to rest in the afternoon when circumstances permit 3   Sitting and talking to someone 1   Sitting quietly after a lunch without alcohol 3   In a car, while stopped for a few minutes in traffic 2   Total score 18   Neck circumference 16.25     {MALLAMPATI:3    Physical Exam  Vitals signs reviewed. Constitutional:       Appearance: Normal appearance. HENT:      Head: Normocephalic and atraumatic. Nose: Nose normal.      Mouth/Throat:      Mouth: Mucous membranes are moist.   Eyes:      Extraocular Movements: Extraocular movements intact. Pupils: Pupils are equal, round, and reactive to light. Neck:      Musculoskeletal: Normal range of motion and neck supple. Cardiovascular:      Rate and Rhythm: Normal rate and regular rhythm. Pulses: Normal pulses. Heart sounds: Normal heart sounds. Pulmonary:      Effort: Pulmonary effort is normal.      Breath sounds: Normal breath sounds. Abdominal:      General: Abdomen is flat. Palpations: Abdomen is soft. Musculoskeletal: Normal range of motion. Skin:     General: Skin is warm and dry. Neurological:      General: No focal deficit present. Mental Status: He is alert and oriented to person, place, and time.    Psychiatric:         Mood and Affect: Mood normal.         Behavior: Behavior normal.         Radiology: None    Assessment and Plan     Problem List Pulmonary Problems    SHAE (obstructive sleep apnea)     Advised to be compliant with the CPAP  Loose weight            Other    Obesity (BMI 30.0-34. 9)     Advised to loose weight with diet and exercise           Excessive daytime sleepiness     Advised to be compliant with the CPAP  Loose weight                    Return in about 1 year (around 9/4/2021) for 2 week download data.      Progress notes sent to the referring Provider    Pearl Lyon MD  9/4/2020  9:22 AM

## 2021-06-09 ENCOUNTER — APPOINTMENT (RX ONLY)
Dept: URBAN - METROPOLITAN AREA CLINIC 174 | Facility: CLINIC | Age: 66
Setting detail: DERMATOLOGY
End: 2021-06-09

## 2021-06-09 DIAGNOSIS — L57.0 ACTINIC KERATOSIS: ICD-10-CM | Status: INADEQUATELY CONTROLLED

## 2021-06-09 PROCEDURE — 17000 DESTRUCT PREMALG LESION: CPT

## 2021-06-09 PROCEDURE — ? LIQUID NITROGEN

## 2021-06-09 PROCEDURE — 17003 DESTRUCT PREMALG LES 2-14: CPT

## 2021-06-09 PROCEDURE — ? COUNSELING

## 2021-06-09 ASSESSMENT — LOCATION SIMPLE DESCRIPTION DERM
LOCATION SIMPLE: LEFT CHEEK
LOCATION SIMPLE: LEFT EAR
LOCATION SIMPLE: LEFT FOREHEAD
LOCATION SIMPLE: LEFT ZYGOMA
LOCATION SIMPLE: LEFT LIP

## 2021-06-09 ASSESSMENT — LOCATION DETAILED DESCRIPTION DERM
LOCATION DETAILED: LEFT MEDIAL ZYGOMA
LOCATION DETAILED: LEFT SUPERIOR HELIX
LOCATION DETAILED: LEFT SUPERIOR PREAURICULAR CHEEK
LOCATION DETAILED: LEFT INFERIOR HELIX
LOCATION DETAILED: LEFT UPPER CUTANEOUS LIP
LOCATION DETAILED: LEFT INFERIOR FOREHEAD
LOCATION DETAILED: LEFT SUPERIOR LATERAL MALAR CHEEK
LOCATION DETAILED: LEFT SUPERIOR CENTRAL MALAR CHEEK

## 2021-06-09 ASSESSMENT — LOCATION ZONE DERM
LOCATION ZONE: EAR
LOCATION ZONE: FACE
LOCATION ZONE: LIP

## 2021-06-09 NOTE — PROCEDURE: LIQUID NITROGEN
Consent: The patient's consent was obtained including but not limited to risks of crusting, scabbing, blistering, scarring, darker or lighter pigmentary change, recurrence, incomplete removal and infection.
Duration Of Freeze Thaw-Cycle (Seconds): 6
Number Of Freeze-Thaw Cycles: 1 freeze-thaw cycle
Detail Level: Detailed
Render Note In Bullet Format When Appropriate: No
Post-Care Instructions: I reviewed with the patient in detail post-care instructions. Patient is to wear sunprotection, and avoid picking at any of the treated lesions. Pt may apply Vaseline to crusted or scabbing areas.

## 2021-07-15 ENCOUNTER — APPOINTMENT (RX ONLY)
Dept: URBAN - METROPOLITAN AREA CLINIC 174 | Facility: CLINIC | Age: 66
Setting detail: DERMATOLOGY
End: 2021-07-15

## 2021-07-15 DIAGNOSIS — L57.0 ACTINIC KERATOSIS: ICD-10-CM | Status: RESOLVED

## 2021-07-15 DIAGNOSIS — L81.4 OTHER MELANIN HYPERPIGMENTATION: ICD-10-CM | Status: STABLE

## 2021-07-15 DIAGNOSIS — Z71.89 OTHER SPECIFIED COUNSELING: ICD-10-CM

## 2021-07-15 DIAGNOSIS — D22 MELANOCYTIC NEVI: ICD-10-CM | Status: STABLE

## 2021-07-15 DIAGNOSIS — D18.0 HEMANGIOMA: ICD-10-CM | Status: STABLE

## 2021-07-15 DIAGNOSIS — L82.1 OTHER SEBORRHEIC KERATOSIS: ICD-10-CM | Status: STABLE

## 2021-07-15 PROBLEM — D18.01 HEMANGIOMA OF SKIN AND SUBCUTANEOUS TISSUE: Status: ACTIVE | Noted: 2021-07-15

## 2021-07-15 PROBLEM — D22.5 MELANOCYTIC NEVI OF TRUNK: Status: ACTIVE | Noted: 2021-07-15

## 2021-07-15 PROCEDURE — 99213 OFFICE O/P EST LOW 20 MIN: CPT

## 2021-07-15 PROCEDURE — ? SUNSCREEN TREATMENT REGIMEN

## 2021-07-15 PROCEDURE — ? FULL BODY SKIN EXAM

## 2021-07-15 PROCEDURE — ? COUNSELING

## 2021-07-15 ASSESSMENT — LOCATION DETAILED DESCRIPTION DERM
LOCATION DETAILED: LEFT SUPERIOR MEDIAL UPPER BACK
LOCATION DETAILED: LEFT SUPERIOR VERMILION BORDER
LOCATION DETAILED: LEFT SUPERIOR HELIX
LOCATION DETAILED: LEFT INFERIOR HELIX
LOCATION DETAILED: LEFT SUPERIOR UPPER BACK
LOCATION DETAILED: LEFT MEDIAL TEMPLE
LOCATION DETAILED: LEFT SUPERIOR LATERAL MALAR CHEEK
LOCATION DETAILED: LEFT MEDIAL UPPER BACK
LOCATION DETAILED: SUPERIOR THORACIC SPINE

## 2021-07-15 ASSESSMENT — LOCATION SIMPLE DESCRIPTION DERM
LOCATION SIMPLE: LEFT CHEEK
LOCATION SIMPLE: LEFT UPPER LIP
LOCATION SIMPLE: LEFT UPPER BACK
LOCATION SIMPLE: LEFT EAR
LOCATION SIMPLE: LEFT TEMPLE
LOCATION SIMPLE: UPPER BACK

## 2021-07-15 ASSESSMENT — LOCATION ZONE DERM
LOCATION ZONE: TRUNK
LOCATION ZONE: EAR
LOCATION ZONE: LIP
LOCATION ZONE: FACE

## 2021-10-19 ENCOUNTER — OFFICE VISIT (OUTPATIENT)
Dept: PULMONOLOGY | Age: 66
End: 2021-10-19
Payer: COMMERCIAL

## 2021-10-19 VITALS
WEIGHT: 221 LBS | HEART RATE: 63 BPM | BODY MASS INDEX: 29.29 KG/M2 | SYSTOLIC BLOOD PRESSURE: 110 MMHG | HEIGHT: 73 IN | OXYGEN SATURATION: 96 % | DIASTOLIC BLOOD PRESSURE: 70 MMHG

## 2021-10-19 DIAGNOSIS — G47.33 OSA (OBSTRUCTIVE SLEEP APNEA): ICD-10-CM

## 2021-10-19 DIAGNOSIS — E66.9 OBESITY (BMI 30.0-34.9): ICD-10-CM

## 2021-10-19 DIAGNOSIS — G47.19 EXCESSIVE DAYTIME SLEEPINESS: ICD-10-CM

## 2021-10-19 PROCEDURE — 99213 OFFICE O/P EST LOW 20 MIN: CPT | Performed by: INTERNAL MEDICINE

## 2021-10-19 PROCEDURE — G8427 DOCREV CUR MEDS BY ELIG CLIN: HCPCS | Performed by: INTERNAL MEDICINE

## 2021-10-19 PROCEDURE — G8484 FLU IMMUNIZE NO ADMIN: HCPCS | Performed by: INTERNAL MEDICINE

## 2021-10-19 PROCEDURE — G8419 CALC BMI OUT NRM PARAM NOF/U: HCPCS | Performed by: INTERNAL MEDICINE

## 2021-10-19 RX ORDER — WARFARIN SODIUM 5 MG/1
TABLET ORAL
COMMUNITY
Start: 2021-10-04

## 2021-10-19 RX ORDER — LEVOTHYROXINE SODIUM 0.05 MG/1
TABLET ORAL
COMMUNITY

## 2021-10-19 RX ORDER — HYDROXYZINE PAMOATE 25 MG/1
CAPSULE ORAL
COMMUNITY
Start: 2020-11-19

## 2021-10-19 ASSESSMENT — ENCOUNTER SYMPTOMS
EYE DISCHARGE: 0
ABDOMINAL PAIN: 0
SHORTNESS OF BREATH: 0
COUGH: 0
EYE ITCHING: 0
ABDOMINAL DISTENTION: 0
BACK PAIN: 0

## 2021-10-19 NOTE — PROGRESS NOTES
Meredith Davis  1955  Referring Provider: Dr. Lieutenant Maldonado    Subjective:     Chief Complaint   Patient presents with    Follow-up     compliance check, aaron Richardson        HPI  Selene Gibson is a 61 y.o. male has come back as a followup. He was last seen 1 year ago. He has moderate SHAE on a Auto CPAP which he is using it most of the nights. He says that it is helping him. He has gained about 4 lbs. He has a FFM. He is not sleepy or tired during the day time. His 2 week download data showed that his residual AHI is 5.8 and leak is for only 45 secs and his 90th percentile pressure is 10.5 cm h20. Current Outpatient Medications   Medication Sig Dispense Refill    warfarin (COUMADIN) 5 MG tablet       hydrOXYzine (VISTARIL) 25 MG capsule hydroxyzine pamoate 25 mg capsule      flecainide (TAMBOCOR) 100 MG tablet Take 1 tablet by mouth 2 times daily 180 tablet 3    diphenhydrAMINE (BENADRYL) 25 MG capsule Take 25 mg by mouth every 6 hours as needed for Itching      albuterol sulfate  (90 Base) MCG/ACT inhaler Inhale 2 puffs into the lungs every 6 hours as needed for Wheezing      famotidine (PEPCID) 20 MG tablet Take 20 mg by mouth 2 times daily      montelukast (SINGULAIR) 10 MG tablet Take 10 mg by mouth nightly      atorvastatin (LIPITOR) 10 MG tablet Take 10 mg by mouth daily      levothyroxine (SYNTHROID) 50 MCG tablet Take 50 mcg by mouth Daily      metoprolol (TOPROL-XL) 25 MG XL tablet Take 12.5 mg by mouth daily       Fexofenadine HCl (ALLEGRA ALLERGY PO) Take by mouth daily       levothyroxine (SYNTHROID) 50 MCG tablet Synthroid 50 mcg tablet   1 tab daily (Patient not taking: Reported on 10/19/2021)      apixaban (ELIQUIS) 5 MG TABS tablet Take 5 mg by mouth 2 times daily (Patient not taking: Reported on 10/19/2021)       No current facility-administered medications for this visit.        Allergies   Allergen Reactions    Shellfish-Derived Products Anaphylaxis and Swelling    Other Past Surgical History:   Procedure Laterality Date    CARDIAC CATHETERIZATION      20 years ago\"age 47 had cath and dx with atrial fib\"    COLONOSCOPY      age 50\"also age 56   [facility] ELBOW SURGERY  01/10/2012    Right elbow exploration , removal of loose body( per old chart hx ORIF right ulnar/radial shaft fx 2012) and also hx ulnar tunnel release right 2016    JOINT REPLACEMENT      per old chart total right hip 5/2017 and total left hip 8/2017    RHINOPLASTY      pe rold chart hx nasal fx ( in college)    TONSILLECTOMY  per old chart 5       Social History     Socioeconomic History    Marital status:      Spouse name: None    Number of children: None    Years of education: None    Highest education level: None   Occupational History    None   Tobacco Use    Smoking status: Never Smoker    Smokeless tobacco: Never Used   Vaping Use    Vaping Use: Never used   Substance and Sexual Activity    Alcohol use: No     Alcohol/week: 0.0 standard drinks    Drug use: No    Sexual activity: Never   Other Topics Concern    None   Social History Narrative    None     Social Determinants of Health     Financial Resource Strain:     Difficulty of Paying Living Expenses:    Food Insecurity:     Worried About Running Out of Food in the Last Year:     Ran Out of Food in the Last Year:    Transportation Needs:     Lack of Transportation (Medical):      Lack of Transportation (Non-Medical):    Physical Activity:     Days of Exercise per Week:     Minutes of Exercise per Session:    Stress:     Feeling of Stress :    Social Connections:     Frequency of Communication with Friends and Family:     Frequency of Social Gatherings with Friends and Family:     Attends Catholic Services:     Active Member of Clubs or Organizations:     Attends Club or Organization Meetings:     Marital Status:    Intimate Partner Violence:     Fear of Current or Ex-Partner:     Emotionally Abused:     Physically Abused:     Sexually Abused:        Review of Systems   Constitutional: Negative for fatigue. HENT: Negative for congestion and postnasal drip. Eyes: Negative for discharge and itching. Respiratory: Negative for cough and shortness of breath. Cardiovascular: Negative for chest pain and leg swelling. Gastrointestinal: Negative for abdominal distention and abdominal pain. Endocrine: Negative for cold intolerance and heat intolerance. Genitourinary: Negative for enuresis and frequency. Musculoskeletal: Negative for arthralgias and back pain. Allergic/Immunologic: Negative for environmental allergies and food allergies. Neurological: Negative for light-headedness and headaches. Hematological: Negative for adenopathy. Psychiatric/Behavioral: Negative for agitation and behavioral problems. Objective:   /70   Pulse 63   Ht 6' 1\" (1.854 m)   Wt 221 lb (100.2 kg)   SpO2 96%   BMI 29.16 kg/m²   Body mass index is 29.16 kg/m². Sleep Medicine 1/30/2019   Sitting and reading 3   Watching TV 3   Sitting, inactive in a public place (e.g. a theatre or a meeting) 1   As a passenger in a car for an hour without a break 2   Lying down to rest in the afternoon when circumstances permit 3   Sitting and talking to someone 1   Sitting quietly after a lunch without alcohol 3   In a car, while stopped for a few minutes in traffic 2   Total score 18   Neck circumference 16.25     {MALLAMPATI:3    Physical Exam  Vitals reviewed. Constitutional:       Appearance: Normal appearance. HENT:      Head: Normocephalic and atraumatic. Nose: Nose normal.      Mouth/Throat:      Mouth: Mucous membranes are moist.   Eyes:      Extraocular Movements: Extraocular movements intact. Pupils: Pupils are equal, round, and reactive to light. Cardiovascular:      Rate and Rhythm: Normal rate and regular rhythm. Pulses: Normal pulses. Heart sounds: Normal heart sounds.    Pulmonary: Effort: Pulmonary effort is normal.      Breath sounds: Normal breath sounds. Abdominal:      General: Abdomen is flat. Palpations: Abdomen is soft. Musculoskeletal:         General: Normal range of motion. Cervical back: Normal range of motion and neck supple. Skin:     General: Skin is warm and dry. Neurological:      General: No focal deficit present. Mental Status: He is alert and oriented to person, place, and time. Psychiatric:         Mood and Affect: Mood normal.         Behavior: Behavior normal.         Radiology: None    Assessment and Plan     Problem List        Pulmonary Problems    SHAE (obstructive sleep apnea)      Advised to be compliant with the CPAP  Loose weight            Other    Obesity (BMI 30.0-34. 9)      Advised to loose weight with diet and exercise           Excessive daytime sleepiness      Advised to be compliant with the CPAP  Loose weight                    Follow-Up:    Return in about 1 year (around 10/19/2022) for 2 week download data.      Progress notes sent to the referring Provider    Dru Michelle MD MD  10/19/2021  10:16 AM

## 2022-03-29 ENCOUNTER — APPOINTMENT (RX ONLY)
Dept: URBAN - METROPOLITAN AREA CLINIC 174 | Facility: CLINIC | Age: 67
Setting detail: DERMATOLOGY
End: 2022-03-29

## 2022-03-29 DIAGNOSIS — D22 MELANOCYTIC NEVI: ICD-10-CM

## 2022-03-29 DIAGNOSIS — L57.0 ACTINIC KERATOSIS: ICD-10-CM

## 2022-03-29 PROBLEM — D22.5 MELANOCYTIC NEVI OF TRUNK: Status: ACTIVE | Noted: 2022-03-29

## 2022-03-29 PROCEDURE — 17000 DESTRUCT PREMALG LESION: CPT

## 2022-03-29 PROCEDURE — ? LIQUID NITROGEN

## 2022-03-29 PROCEDURE — ? PHOTO-DOCUMENTATION

## 2022-03-29 PROCEDURE — 99212 OFFICE O/P EST SF 10 MIN: CPT | Mod: 25

## 2022-03-29 PROCEDURE — 17003 DESTRUCT PREMALG LES 2-14: CPT

## 2022-03-29 PROCEDURE — ? COUNSELING

## 2022-03-29 PROCEDURE — ? OBSERVATION AND MEASURE

## 2022-03-29 ASSESSMENT — LOCATION ZONE DERM
LOCATION ZONE: TRUNK
LOCATION ZONE: FACE

## 2022-03-29 ASSESSMENT — LOCATION SIMPLE DESCRIPTION DERM
LOCATION SIMPLE: LEFT LOWER BACK
LOCATION SIMPLE: LEFT CHEEK

## 2022-03-29 ASSESSMENT — LOCATION DETAILED DESCRIPTION DERM
LOCATION DETAILED: LEFT CENTRAL MALAR CHEEK
LOCATION DETAILED: LEFT SUPERIOR LATERAL MALAR CHEEK
LOCATION DETAILED: LEFT SUPERIOR MEDIAL LOWER BACK

## 2022-03-29 NOTE — PROCEDURE: PHOTO-DOCUMENTATION
Photo Preface (Leave Blank If You Do Not Want): Photographs were obtained today
Details (Free Text): may bx on rtn-monitor for now since 4mm---check with fbe
Detail Level: Zone

## 2022-03-29 NOTE — PROCEDURE: LIQUID NITROGEN
Consent: The patient's consent was obtained including but not limited to risks of crusting, scabbing, blistering, scarring, darker or lighter pigmentary change, recurrence, incomplete removal and infection.
Render Post-Care Instructions In Note?: no
Post-Care Instructions: I reviewed with the patient in detail post-care instructions. Patient is to wear sunprotection, and avoid picking at any of the treated lesions. Pt may apply Vaseline to crusted or scabbing areas.
Number Of Freeze-Thaw Cycles: 1 freeze-thaw cycle
Show Applicator Variable?: Yes
Detail Level: Detailed
Duration Of Freeze Thaw-Cycle (Seconds): 6

## 2022-06-02 ENCOUNTER — APPOINTMENT (RX ONLY)
Dept: URBAN - METROPOLITAN AREA CLINIC 377 | Facility: CLINIC | Age: 67
Setting detail: DERMATOLOGY
End: 2022-06-02

## 2022-06-02 DIAGNOSIS — L57.0 ACTINIC KERATOSIS: ICD-10-CM

## 2022-06-02 DIAGNOSIS — D22 MELANOCYTIC NEVI: ICD-10-CM

## 2022-06-02 PROBLEM — D48.5 NEOPLASM OF UNCERTAIN BEHAVIOR OF SKIN: Status: ACTIVE | Noted: 2022-06-02

## 2022-06-02 PROCEDURE — 17000 DESTRUCT PREMALG LESION: CPT | Mod: 59

## 2022-06-02 PROCEDURE — 11102 TANGNTL BX SKIN SINGLE LES: CPT

## 2022-06-02 PROCEDURE — 17003 DESTRUCT PREMALG LES 2-14: CPT

## 2022-06-02 PROCEDURE — ? LIQUID NITROGEN

## 2022-06-02 PROCEDURE — ? BIOPSY BY SHAVE METHOD

## 2022-06-02 ASSESSMENT — LOCATION DETAILED DESCRIPTION DERM
LOCATION DETAILED: LEFT CENTRAL MALAR CHEEK
LOCATION DETAILED: LEFT FOREHEAD
LOCATION DETAILED: LEFT SUPERIOR MEDIAL LOWER BACK

## 2022-06-02 ASSESSMENT — LOCATION SIMPLE DESCRIPTION DERM
LOCATION SIMPLE: LEFT LOWER BACK
LOCATION SIMPLE: LEFT CHEEK
LOCATION SIMPLE: LEFT FOREHEAD

## 2022-06-02 ASSESSMENT — LOCATION ZONE DERM
LOCATION ZONE: FACE
LOCATION ZONE: TRUNK

## 2022-06-02 NOTE — PROCEDURE: BIOPSY BY SHAVE METHOD
Detail Level: Detailed
Depth Of Biopsy: dermis
Was A Bandage Applied: Yes
Size Of Lesion In Cm: 0.4
X Size Of Lesion In Cm: 0
Biopsy Type: H and E
Biopsy Method: Dermablade
Anesthesia Type: 1% lidocaine with epinephrine
Anesthesia Volume In Cc (Will Not Render If 0): 0.5
Hemostasis: Aluminum Chloride
Wound Care: Petrolatum
Dressing: bandage
Destruction After The Procedure: No
Type Of Destruction Used: Curettage
Curettage Text: The wound bed was treated with curettage after the biopsy was performed.
Cryotherapy Text: The wound bed was treated with cryotherapy after the biopsy was performed.
Electrodesiccation Text: The wound bed was treated with electrodesiccation after the biopsy was performed.
Electrodesiccation And Curettage Text: The wound bed was treated with electrodesiccation and curettage after the biopsy was performed.
Silver Nitrate Text: The wound bed was treated with silver nitrate after the biopsy was performed.
Consent: Written consent was obtained and risks were reviewed including but not limited to scarring, infection, bleeding, scabbing, incomplete removal, nerve damage and allergy to anesthesia.
Post-Care Instructions: I reviewed with the patient in detail post-care instructions. Patient is to keep the biopsy site dry overnight, and then apply bacitracin twice daily until healed. Patient may apply hydrogen peroxide soaks to remove any crusting.
Notification Instructions: Patient will be notified of biopsy results. However, patient instructed to call the office if not contacted within 2 weeks.
Billing Type: Third-Party Bill
Information: Selecting Yes will display possible errors in your note based on the variables you have selected. This validation is only offered as a suggestion for you. PLEASE NOTE THAT THE VALIDATION TEXT WILL BE REMOVED WHEN YOU FINALIZE YOUR NOTE. IF YOU WANT TO FAX A PRELIMINARY NOTE YOU WILL NEED TO TOGGLE THIS TO 'NO' IF YOU DO NOT WANT IT IN YOUR FAXED NOTE.

## 2022-06-02 NOTE — PROCEDURE: LIQUID NITROGEN
Render Note In Bullet Format When Appropriate: No
Number Of Freeze-Thaw Cycles: 1 freeze-thaw cycle
Render Post-Care Instructions In Note?: yes
Post-Care Instructions: I reviewed with the patient in detail post-care instructions. Patient is to wear sunprotection, and avoid picking at any of the treated lesions. Pt may apply Vaseline to crusted or scabbing areas.
Consent: The patient's consent was obtained including but not limited to risks of crusting, scabbing, blistering, scarring, darker or lighter pigmentary change, recurrence, incomplete removal and infection.
Application Tool (Optional): Liquid Nitrogen Sprayer
Detail Level: Detailed
Duration Of Freeze Thaw-Cycle (Seconds): 6

## 2023-02-06 ENCOUNTER — OFFICE VISIT (OUTPATIENT)
Dept: PULMONOLOGY | Age: 68
End: 2023-02-06
Payer: COMMERCIAL

## 2023-02-06 VITALS
WEIGHT: 233 LBS | DIASTOLIC BLOOD PRESSURE: 64 MMHG | HEART RATE: 77 BPM | OXYGEN SATURATION: 97 % | SYSTOLIC BLOOD PRESSURE: 104 MMHG | BODY MASS INDEX: 30.88 KG/M2 | HEIGHT: 73 IN

## 2023-02-06 DIAGNOSIS — E66.9 OBESITY (BMI 30.0-34.9): ICD-10-CM

## 2023-02-06 DIAGNOSIS — G47.19 EXCESSIVE DAYTIME SLEEPINESS: ICD-10-CM

## 2023-02-06 DIAGNOSIS — G47.33 OSA (OBSTRUCTIVE SLEEP APNEA): ICD-10-CM

## 2023-02-06 PROCEDURE — G8484 FLU IMMUNIZE NO ADMIN: HCPCS | Performed by: INTERNAL MEDICINE

## 2023-02-06 PROCEDURE — G8417 CALC BMI ABV UP PARAM F/U: HCPCS | Performed by: INTERNAL MEDICINE

## 2023-02-06 PROCEDURE — G8427 DOCREV CUR MEDS BY ELIG CLIN: HCPCS | Performed by: INTERNAL MEDICINE

## 2023-02-06 PROCEDURE — 3074F SYST BP LT 130 MM HG: CPT | Performed by: INTERNAL MEDICINE

## 2023-02-06 PROCEDURE — 99244 OFF/OP CNSLTJ NEW/EST MOD 40: CPT | Performed by: INTERNAL MEDICINE

## 2023-02-06 PROCEDURE — 3078F DIAST BP <80 MM HG: CPT | Performed by: INTERNAL MEDICINE

## 2023-02-06 ASSESSMENT — ENCOUNTER SYMPTOMS
COUGH: 0
ABDOMINAL PAIN: 0
EYE DISCHARGE: 0
EYE ITCHING: 0
ABDOMINAL DISTENTION: 0
SHORTNESS OF BREATH: 0
BACK PAIN: 0

## 2023-02-06 NOTE — PROGRESS NOTES
Dank Kilpatrick  1955  Referring Provider: Prince Leslee MD    Subjective:     Chief Complaint   Patient presents with    Sleep Apnea     Compliance       HPI  Fermin Andino is a 74 y.o. male has come back as a follow up. He has Moderate SHAE on a Auto CPAP which he is using it every night about 8 to 9 hours. He says that it is helping him. He has no loss of weight. He is pedro sleepy during the day time. He has a FFM. His 2 week download data showed a residual AHI is 4 and leak is 5 L.min. david 95th percentile pressure is 10.2  cm h20. Current Outpatient Medications   Medication Sig Dispense Refill    warfarin (COUMADIN) 5 MG tablet       hydrOXYzine (VISTARIL) 25 MG capsule hydroxyzine pamoate 25 mg capsule      flecainide (TAMBOCOR) 100 MG tablet Take 1 tablet by mouth 2 times daily 180 tablet 3    diphenhydrAMINE (BENADRYL) 25 MG capsule Take 25 mg by mouth every 6 hours as needed for Itching      albuterol sulfate  (90 Base) MCG/ACT inhaler Inhale 2 puffs into the lungs every 6 hours as needed for Wheezing      famotidine (PEPCID) 20 MG tablet Take 20 mg by mouth 2 times daily      montelukast (SINGULAIR) 10 MG tablet Take 10 mg by mouth nightly      atorvastatin (LIPITOR) 10 MG tablet Take 10 mg by mouth daily      levothyroxine (SYNTHROID) 50 MCG tablet Take 50 mcg by mouth Daily      metoprolol (TOPROL-XL) 25 MG XL tablet Take 12.5 mg by mouth daily       Fexofenadine HCl (ALLEGRA ALLERGY PO) Take by mouth daily        No current facility-administered medications for this visit.        Allergies   Allergen Reactions    Shellfish-Derived Products Anaphylaxis and Swelling    Other      \"the sticky stuff from EKG monitor pads burned my skin\"    Oxycodone Nausea Only    Tramadol Nausea Only     \       Past Medical History:   Diagnosis Date    Arthritis     hx osteoarthritis per old chart    Asthma     hx per old chart - listed as mild    Atrial fibrillation ([facility] [address]     follow with Dr Livier Presley Environmental allergies     Factor 5 Leiden mutation, heterozygous ([address]     per pt on 4/23/2019\"recently dx with Factor 5 Leiden\" saw hematologist Dr Annika Padgett- at [facility]\"    GERD (gastroesophageal reflux disease)     H/O cardiovascular stress test 12/21/2015    treadmill    H/O echocardiogram 6/9/2014    EF55% mild TR    H/O exercise stress test 02/05/2019    treadmill    History of echocardiogram 02/05/2019    EF 45-50% ABN, Normal left ventricular wall thickness, no regional wall motion abnnormalities were detected, diastolic dysfunction grade I, No pericardial effusion, no significant valvular disease    Hx of blood clots     \"had DVT( right) and PE 3/6/2019    Hx of cardiovascular stress test 3/16/2010    EF 63%. Normal stress Cardiolite perfusion study. Hx of echocardiogram 12/21/2015    EF 50-55%. Normal chamber sizes. Normal LVSF but abnormal diastoliv function. Mild TR. Normal sized abdominal aorta at 1.9cm. Hx of echocardiogram 3/16/2010    EF 55-60%. Normal size LV showing preserved global systolic function and no regional wall motion abnormalities. Normal size LA. Mildly dilated RV. Normal valves. No pericardial effusion.     Hyperlipidemia     Hypertension     PONV (postoperative nausea and vomiting)     \"with elbow surgery got sick\" - hx of motion sickness    Prolonged emergence from general anesthesia     \"with elbow surgery\"    Sleep apnea     hx per old chart- per pt had sleep study 2/2019- uses cpap    SOB (shortness of breath)     Thyroid disease     per old chart hx hypothyroid    Vertigo     hx per old chart/\"per pt on 4/23/2019\"that was a couple of yrs ago\"       Past Surgical History:   Procedure Laterality Date    CARDIAC CATHETERIZATION      20 years ago\"age 47 had cath and dx with atrial fib\"    COLONOSCOPY      age 50\"also age 56    ELBOW SURGERY  01/10/2012    Right elbow exploration , removal of loose body( per old chart hx ORIF right ulnar/radial shaft fx 2012) and also hx ulnar tunnel release right 2016    JOINT REPLACEMENT      per old chart total right hip 5/2017 and total left hip 8/2017    RHINOPLASTY      pe rold chart hx nasal fx ( in college)    TONSILLECTOMY  per old chart 5       Social History     Socioeconomic History    Marital status:      Spouse name: None    Number of children: None    Years of education: None    Highest education level: None   Tobacco Use    Smoking status: Never    Smokeless tobacco: Never   Vaping Use    Vaping Use: Never used   Substance and Sexual Activity    Alcohol use: No     Alcohol/week: 0.0 standard drinks    Drug use: No    Sexual activity: Never       Review of Systems   Constitutional:  Negative for fatigue. HENT:  Negative for congestion and postnasal drip. Eyes:  Negative for discharge and itching. Respiratory:  Negative for cough and shortness of breath. Cardiovascular:  Negative for chest pain and leg swelling. Gastrointestinal:  Negative for abdominal distention and abdominal pain. Endocrine: Negative for cold intolerance and heat intolerance. Genitourinary:  Negative for enuresis and frequency. Musculoskeletal:  Negative for arthralgias and back pain. Allergic/Immunologic: Negative for environmental allergies and food allergies. Neurological:  Negative for light-headedness and headaches. Hematological:  Negative for adenopathy. Psychiatric/Behavioral:  Negative for agitation and behavioral problems. Objective:   /64   Pulse 77   Ht 6' 1\" (1.854 m)   Wt 233 lb (105.7 kg)   SpO2 97%   BMI 30.74 kg/m²   Body mass index is 30.74 kg/m².   Sleep Medicine 1/30/2019   Sitting and reading 3   Watching TV 3   Sitting, inactive in a public place (e.g. a theatre or a meeting) 1   As a passenger in a car for an hour without a break 2   Lying down to rest in the afternoon when circumstances permit 3   Sitting and talking to someone 1   Sitting quietly after a lunch without alcohol 3   In a car, while stopped for a few minutes in traffic 2   [place] Sleepiness Score 18   Neck circumference (Inches) 16.25     Mallampati 3    Physical Exam  Vitals reviewed. Constitutional:       Appearance: Normal appearance. HENT:      Head: Normocephalic and atraumatic. Nose: Nose normal.      Mouth/Throat:      Mouth: Mucous membranes are moist.   Eyes:      Extraocular Movements: Extraocular movements intact. Pupils: Pupils are equal, round, and reactive to light. Cardiovascular:      Rate and Rhythm: Normal rate and regular rhythm. Pulses: Normal pulses. Heart sounds: Normal heart sounds. Pulmonary:      Effort: Pulmonary effort is normal.      Breath sounds: Normal breath sounds. Abdominal:      General: Abdomen is flat. Palpations: Abdomen is soft. Musculoskeletal:         General: Normal range of motion. Cervical back: Normal range of motion and neck supple. Skin:     General: Skin is warm and dry. Neurological:      General: No focal deficit present. Mental Status: He is alert and oriented to person, place, and time. Psychiatric:         Mood and Affect: Mood normal.         Behavior: Behavior normal.       Radiology: None    Assessment and Plan     Problem List          Respiratory    SHAE (obstructive sleep apnea)      Advised to be compliant with the CPAP  Loose weight            Other    Obesity (BMI 30.0-34. 9)      Advised to loose weight with diet and exercise           Excessive daytime sleepiness      Advised to be compliant with the CPAP  Loose weight                   Follow-Up:    Return in about 1 year (around 2/6/2024) for 2 week download data.      Progress notes sent to the referring Provider    Rock Rut MD MD  2/6/2023  3:33 PM

## 2023-08-10 ENCOUNTER — RX ONLY (OUTPATIENT)
Age: 68
Setting detail: RX ONLY
End: 2023-08-10

## 2023-08-10 ENCOUNTER — APPOINTMENT (RX ONLY)
Dept: URBAN - METROPOLITAN AREA CLINIC 377 | Facility: CLINIC | Age: 68
Setting detail: DERMATOLOGY
End: 2023-08-10

## 2023-08-10 DIAGNOSIS — L81.4 OTHER MELANIN HYPERPIGMENTATION: ICD-10-CM | Status: STABLE

## 2023-08-10 DIAGNOSIS — D22 MELANOCYTIC NEVI: ICD-10-CM | Status: STABLE

## 2023-08-10 DIAGNOSIS — L82.1 OTHER SEBORRHEIC KERATOSIS: ICD-10-CM | Status: STABLE

## 2023-08-10 DIAGNOSIS — Z71.89 OTHER SPECIFIED COUNSELING: ICD-10-CM

## 2023-08-10 DIAGNOSIS — L57.0 ACTINIC KERATOSIS: ICD-10-CM | Status: INADEQUATELY CONTROLLED

## 2023-08-10 DIAGNOSIS — D18.0 HEMANGIOMA: ICD-10-CM | Status: STABLE

## 2023-08-10 DIAGNOSIS — B35.3 TINEA PEDIS: ICD-10-CM | Status: INADEQUATELY CONTROLLED

## 2023-08-10 PROBLEM — D18.01 HEMANGIOMA OF SKIN AND SUBCUTANEOUS TISSUE: Status: ACTIVE | Noted: 2023-08-10

## 2023-08-10 PROBLEM — D22.5 MELANOCYTIC NEVI OF TRUNK: Status: ACTIVE | Noted: 2023-08-10

## 2023-08-10 PROCEDURE — 99214 OFFICE O/P EST MOD 30 MIN: CPT | Mod: 25

## 2023-08-10 PROCEDURE — ? COUNSELING

## 2023-08-10 PROCEDURE — 17000 DESTRUCT PREMALG LESION: CPT

## 2023-08-10 PROCEDURE — ? MEDICATION COUNSELING

## 2023-08-10 PROCEDURE — ? PRESCRIPTION MEDICATION MANAGEMENT

## 2023-08-10 PROCEDURE — ? LIQUID NITROGEN

## 2023-08-10 PROCEDURE — 17003 DESTRUCT PREMALG LES 2-14: CPT

## 2023-08-10 PROCEDURE — ? FULL BODY SKIN EXAM

## 2023-08-10 PROCEDURE — ? SUNSCREEN TREATMENT REGIMEN

## 2023-08-10 PROCEDURE — ? PRESCRIPTION

## 2023-08-10 RX ORDER — FLUOROURACIL 5 MG/G
CREAM TOPICAL
Qty: 40 | Refills: 2 | Status: ERX | COMMUNITY
Start: 2023-08-10

## 2023-08-10 RX ORDER — KETOCONAZOLE 20 MG/G
CREAM TOPICAL BID
Qty: 60 | Refills: 8 | Status: ERX | COMMUNITY
Start: 2023-08-10

## 2023-08-10 RX ADMIN — KETOCONAZOLE: 20 CREAM TOPICAL at 00:00

## 2023-08-10 ASSESSMENT — LOCATION SIMPLE DESCRIPTION DERM
LOCATION SIMPLE: RIGHT FOOT
LOCATION SIMPLE: LEFT GREAT TOE
LOCATION SIMPLE: UPPER BACK
LOCATION SIMPLE: LEFT PLANTAR SURFACE
LOCATION SIMPLE: RIGHT FOREHEAD
LOCATION SIMPLE: LEFT CHEEK
LOCATION SIMPLE: LEFT UPPER BACK

## 2023-08-10 ASSESSMENT — LOCATION DETAILED DESCRIPTION DERM
LOCATION DETAILED: LEFT MEDIAL UPPER BACK
LOCATION DETAILED: LEFT MEDIAL PLANTAR MIDFOOT
LOCATION DETAILED: LEFT GREAT TOENAIL
LOCATION DETAILED: LEFT SUPERIOR MEDIAL UPPER BACK
LOCATION DETAILED: RIGHT LATERAL DORSAL FOOT
LOCATION DETAILED: LEFT MEDIAL MALAR CHEEK
LOCATION DETAILED: SUPERIOR THORACIC SPINE
LOCATION DETAILED: LEFT CENTRAL MALAR CHEEK
LOCATION DETAILED: RIGHT SUPERIOR FOREHEAD
LOCATION DETAILED: LEFT SUPERIOR UPPER BACK

## 2023-08-10 ASSESSMENT — LOCATION ZONE DERM
LOCATION ZONE: TOENAIL
LOCATION ZONE: TRUNK
LOCATION ZONE: FEET
LOCATION ZONE: FACE

## 2023-08-10 NOTE — PROCEDURE: LIQUID NITROGEN
Render Note In Bullet Format When Appropriate: No
Detail Level: Detailed
Show Applicator Variable?: Yes
Number Of Freeze-Thaw Cycles: 1 freeze-thaw cycle
Duration Of Freeze Thaw-Cycle (Seconds): 6
Post-Care Instructions: I reviewed with the patient in detail post-care instructions. Patient is to wear sunprotection, and avoid picking at any of the treated lesions. Pt may apply Vaseline to crusted or scabbing areas.
Consent: The patient's consent was obtained including but not limited to risks of crusting, scabbing, blistering, scarring, darker or lighter pigmentary change, recurrence, incomplete removal and infection.

## 2023-08-10 NOTE — PROCEDURE: MEDICATION COUNSELING
09-Jan-2020 22:10 Libtayo Pregnancy And Lactation Text: This medication is contraindicated in pregnancy and when breast feeding.

## 2023-08-10 NOTE — PROCEDURE: PRESCRIPTION MEDICATION MANAGEMENT
Initiate Treatment: ketoconazole 2 % topical cream Bid
Detail Level: Zone
Render In Strict Bullet Format?: No

## 2024-04-16 ENCOUNTER — OFFICE VISIT (OUTPATIENT)
Dept: PULMONOLOGY | Age: 69
End: 2024-04-16
Payer: COMMERCIAL

## 2024-04-16 VITALS
SYSTOLIC BLOOD PRESSURE: 108 MMHG | HEIGHT: 73 IN | DIASTOLIC BLOOD PRESSURE: 80 MMHG | WEIGHT: 230.8 LBS | HEART RATE: 75 BPM | OXYGEN SATURATION: 95 % | BODY MASS INDEX: 30.59 KG/M2

## 2024-04-16 DIAGNOSIS — G47.33 OSA (OBSTRUCTIVE SLEEP APNEA): Primary | ICD-10-CM

## 2024-04-16 DIAGNOSIS — E66.9 OBESITY (BMI 30.0-34.9): ICD-10-CM

## 2024-04-16 DIAGNOSIS — G47.19 EXCESSIVE DAYTIME SLEEPINESS: ICD-10-CM

## 2024-04-16 PROCEDURE — G8427 DOCREV CUR MEDS BY ELIG CLIN: HCPCS | Performed by: INTERNAL MEDICINE

## 2024-04-16 PROCEDURE — G8419 CALC BMI OUT NRM PARAM NOF/U: HCPCS | Performed by: INTERNAL MEDICINE

## 2024-04-16 PROCEDURE — 99214 OFFICE O/P EST MOD 30 MIN: CPT | Performed by: INTERNAL MEDICINE

## 2024-04-16 PROCEDURE — 3074F SYST BP LT 130 MM HG: CPT | Performed by: INTERNAL MEDICINE

## 2024-04-16 PROCEDURE — 3079F DIAST BP 80-89 MM HG: CPT | Performed by: INTERNAL MEDICINE

## 2024-04-16 PROCEDURE — 1036F TOBACCO NON-USER: CPT | Performed by: INTERNAL MEDICINE

## 2024-04-16 PROCEDURE — 1123F ACP DISCUSS/DSCN MKR DOCD: CPT | Performed by: INTERNAL MEDICINE

## 2024-04-16 PROCEDURE — 3017F COLORECTAL CA SCREEN DOC REV: CPT | Performed by: INTERNAL MEDICINE

## 2024-04-16 ASSESSMENT — ENCOUNTER SYMPTOMS
SHORTNESS OF BREATH: 0
EYE ITCHING: 0
COUGH: 0
BACK PAIN: 0
ABDOMINAL DISTENTION: 0
EYE DISCHARGE: 0
ABDOMINAL PAIN: 0

## 2024-04-16 NOTE — PROGRESS NOTES
RHINOPLASTY      pe rold chart hx nasal fx ( in college)    TONSILLECTOMY  per old chart 1967       Social History     Socioeconomic History    Marital status:      Spouse name: None    Number of children: None    Years of education: None    Highest education level: None   Tobacco Use    Smoking status: Never    Smokeless tobacco: Never   Vaping Use    Vaping Use: Never used   Substance and Sexual Activity    Alcohol use: No     Alcohol/week: 0.0 standard drinks of alcohol    Drug use: No    Sexual activity: Never       Review of Systems   Constitutional:  Negative for fatigue.   HENT:  Negative for congestion and postnasal drip.    Eyes:  Negative for discharge and itching.   Respiratory:  Negative for cough and shortness of breath.    Cardiovascular:  Negative for chest pain and leg swelling.   Gastrointestinal:  Negative for abdominal distention and abdominal pain.   Endocrine: Negative for cold intolerance and heat intolerance.   Genitourinary:  Negative for enuresis and frequency.   Musculoskeletal:  Negative for arthralgias and back pain.   Allergic/Immunologic: Negative for environmental allergies and food allergies.   Neurological:  Negative for light-headedness and headaches.   Hematological:  Negative for adenopathy.   Psychiatric/Behavioral:  Negative for agitation and behavioral problems.        Objective:   /80   Pulse 75   Ht 1.854 m (6' 1\")   Wt 104.7 kg (230 lb 12.8 oz)   SpO2 95%   BMI 30.45 kg/m²   Body mass index is 30.45 kg/m².      1/30/2019     2:29 PM 1/30/2019     2:28 PM   Sleep Medicine   Sitting and reading  3   Watching TV  3   Sitting, inactive in a public place (e.g. a theatre or a meeting)  1   As a passenger in a car for an hour without a break  2   Lying down to rest in the afternoon when circumstances permit  3   Sitting and talking to someone  1   Sitting quietly after a lunch without alcohol  3   In a car, while stopped for a few minutes in traffic  2   Dianne

## 2025-03-06 NOTE — ASSESSMENT & PLAN NOTE
Advised to loose weight with diet and exercise Medical Necessity Information: It is in your best interest to select a reason for this procedure from the list below. All of these items fulfill various CMS LCD requirements except the new and changing color options. Render Note In Bullet Format When Appropriate: No Spray Paint Text: The liquid nitrogen was applied to the skin utilizing a spray paint frosting technique. Show Applicator Variable?: Yes Medical Necessity Clause: This procedure was medically necessary because the lesions that were treated were: Number Of Freeze-Thaw Cycles: 1 freeze-thaw cycle Post-Care Instructions: I reviewed with the patient in detail post-care instructions. Patient is to wear sunprotection, and avoid picking at any of the treated lesions. Pt may apply Vaseline to crusted or scabbing areas. Consent: The patient's consent was obtained including but not limited to risks of crusting, scabbing, blistering, scarring, darker or lighter pigmentary change, recurrence, incomplete removal and infection. Detail Level: Detailed

## 2025-06-10 ENCOUNTER — OFFICE VISIT (OUTPATIENT)
Dept: PULMONOLOGY | Age: 70
End: 2025-06-10
Payer: MEDICARE

## 2025-06-10 VITALS
HEIGHT: 73 IN | OXYGEN SATURATION: 98 % | HEART RATE: 90 BPM | WEIGHT: 221.8 LBS | DIASTOLIC BLOOD PRESSURE: 68 MMHG | BODY MASS INDEX: 29.4 KG/M2 | SYSTOLIC BLOOD PRESSURE: 132 MMHG

## 2025-06-10 DIAGNOSIS — G47.19 EXCESSIVE DAYTIME SLEEPINESS: ICD-10-CM

## 2025-06-10 DIAGNOSIS — G47.33 OSA (OBSTRUCTIVE SLEEP APNEA): Primary | ICD-10-CM

## 2025-06-10 DIAGNOSIS — E66.811 OBESITY (BMI 30.0-34.9): ICD-10-CM

## 2025-06-10 PROCEDURE — 3078F DIAST BP <80 MM HG: CPT | Performed by: INTERNAL MEDICINE

## 2025-06-10 PROCEDURE — 1159F MED LIST DOCD IN RCRD: CPT | Performed by: INTERNAL MEDICINE

## 2025-06-10 PROCEDURE — 3075F SYST BP GE 130 - 139MM HG: CPT | Performed by: INTERNAL MEDICINE

## 2025-06-10 PROCEDURE — 99214 OFFICE O/P EST MOD 30 MIN: CPT | Performed by: INTERNAL MEDICINE

## 2025-06-10 PROCEDURE — 1123F ACP DISCUSS/DSCN MKR DOCD: CPT | Performed by: INTERNAL MEDICINE

## 2025-06-10 RX ORDER — LANOLIN ALCOHOL/MO/W.PET/CERES
2500 CREAM (GRAM) TOPICAL DAILY
COMMUNITY

## 2025-06-10 ASSESSMENT — ENCOUNTER SYMPTOMS
COUGH: 0
ABDOMINAL PAIN: 0
BACK PAIN: 0
SHORTNESS OF BREATH: 0
ABDOMINAL DISTENTION: 0
EYE ITCHING: 0
EYE DISCHARGE: 0

## 2025-06-10 NOTE — PROGRESS NOTES
Neck (Inches) 16.25      Mallampati 3    Physical Exam  Vitals reviewed.   Constitutional:       Appearance: Normal appearance.   HENT:      Head: Normocephalic and atraumatic.      Nose: Nose normal.      Mouth/Throat:      Mouth: Mucous membranes are moist.   Eyes:      Extraocular Movements: Extraocular movements intact.      Pupils: Pupils are equal, round, and reactive to light.   Cardiovascular:      Rate and Rhythm: Normal rate and regular rhythm.      Pulses: Normal pulses.      Heart sounds: Normal heart sounds.   Pulmonary:      Effort: Pulmonary effort is normal.      Breath sounds: Normal breath sounds.   Abdominal:      General: Abdomen is flat.      Palpations: Abdomen is soft.   Musculoskeletal:         General: Normal range of motion.      Cervical back: Normal range of motion and neck supple.   Skin:     General: Skin is warm and dry.   Neurological:      General: No focal deficit present.      Mental Status: He is alert and oriented to person, place, and time.   Psychiatric:         Mood and Affect: Mood normal.         Behavior: Behavior normal.         Radiology: None    Assessment and Plan     Problem List           Diagnosed       Respiratory    SHAE (obstructive sleep apnea) - Primary      Advised to be compliant with the CPAP  Loose weight            Other    Obesity (BMI 30.0-34.9)       Advised to loose weight with diet and exercise          Excessive daytime sleepiness      Advised to be compliant with the CPAP  Loose weight                 Total time spent for this encounter: 35 mins    Follow-Up:    No follow-ups on file.     Progress notes sent to the referring Provider    Anthony Phillips MD MD  6/10/2025  4:15 PM